# Patient Record
Sex: MALE | Race: WHITE | NOT HISPANIC OR LATINO | Employment: FULL TIME | URBAN - METROPOLITAN AREA
[De-identification: names, ages, dates, MRNs, and addresses within clinical notes are randomized per-mention and may not be internally consistent; named-entity substitution may affect disease eponyms.]

---

## 2017-02-14 ENCOUNTER — OFFICE VISIT (OUTPATIENT)
Dept: SPORTS MEDICINE | Facility: CLINIC | Age: 57
End: 2017-02-14

## 2017-02-14 VITALS
BODY MASS INDEX: 25.48 KG/M2 | HEIGHT: 70 IN | TEMPERATURE: 98.4 F | HEART RATE: 69 BPM | OXYGEN SATURATION: 99 % | WEIGHT: 178 LBS | DIASTOLIC BLOOD PRESSURE: 62 MMHG | SYSTOLIC BLOOD PRESSURE: 100 MMHG

## 2017-02-14 DIAGNOSIS — Z00.00 PREVENTATIVE HEALTH CARE: Primary | ICD-10-CM

## 2017-02-14 PROCEDURE — 99396 PREV VISIT EST AGE 40-64: CPT | Performed by: FAMILY MEDICINE

## 2017-02-14 NOTE — PROGRESS NOTES
"Subjective   Bebeto Burgess is a 56 y.o. male.   Chief Complaint   Patient presents with   • Annual Exam     CPE       History of Present Illness   1.  CPE  2.  No c/o.   3.  Sees derm q year  4.  Cscope UTD      Review of Systems   Constitutional: Negative for activity change, appetite change, chills, diaphoresis, fatigue, fever and unexpected weight change.   HENT: Negative for congestion, ear pain, postnasal drip, rhinorrhea, sinus pressure, sneezing, sore throat and trouble swallowing.    Eyes: Negative for visual disturbance.   Respiratory: Negative for cough, chest tightness, shortness of breath and wheezing.    Cardiovascular: Negative for chest pain and palpitations.   Gastrointestinal: Negative for abdominal pain, blood in stool, nausea and vomiting.   Endocrine: Negative for cold intolerance, polydipsia, polyphagia and polyuria.   Genitourinary: Negative for dysuria, flank pain, frequency, hematuria and urgency.   Musculoskeletal: Negative for arthralgias, back pain, joint swelling and myalgias.   Skin: Negative for rash.   Allergic/Immunologic: Negative for environmental allergies.   Neurological: Negative for dizziness, syncope, weakness, numbness and headaches.   Hematological: Negative for adenopathy. Does not bruise/bleed easily.   Psychiatric/Behavioral: Negative for agitation, decreased concentration, dysphoric mood, sleep disturbance and suicidal ideas. The patient is not nervous/anxious.      Visit Vitals   • /62 (BP Location: Left arm, Patient Position: Sitting, Cuff Size: Adult)   • Pulse 69   • Temp 98.4 °F (36.9 °C) (Oral)   • Ht 70\" (177.8 cm)   • Wt 178 lb (80.7 kg)   • SpO2 99%   • BMI 25.54 kg/m2         Objective   Physical Exam   Constitutional: He is oriented to person, place, and time. He appears well-developed and well-nourished.   HENT:   Head: Normocephalic and atraumatic.   Right Ear: External ear normal.   Left Ear: External ear normal.   Nose: Nose normal. "   Mouth/Throat: Oropharynx is clear and moist.   Eyes: Conjunctivae and EOM are normal. Pupils are equal, round, and reactive to light.   Neck: Normal range of motion. Neck supple. No thyromegaly present.   Cardiovascular: Normal rate, regular rhythm and normal heart sounds.    No peripheral edema   Pulmonary/Chest: Effort normal and breath sounds normal.   Neurological: He is alert and oriented to person, place, and time.   Skin: Skin is warm, dry and intact.   Psychiatric: He has a normal mood and affect. His behavior is normal. Thought content normal.   Vitals reviewed.  Reviewed labs from 12/16/2016, all good    Assessment/Plan   Bebeto was seen today for annual exam.    Diagnoses and all orders for this visit:    Preventative health care  -     Cancel: CBC & Differential  -     Cancel: Comprehensive Metabolic Panel  -     Cancel: Lipid Panel With / Chol / HDL Ratio  -     Cancel: PSA  -     Cancel: T4, Free  -     Cancel: TSH  -     Cancel: UA / M With / Rflx Culture([facility] ONLY)    Other orders  -     Cancel: Hepatitis C Antibody

## 2018-04-19 ENCOUNTER — OFFICE VISIT (OUTPATIENT)
Dept: SPORTS MEDICINE | Facility: CLINIC | Age: 58
End: 2018-04-19

## 2018-04-19 VITALS
DIASTOLIC BLOOD PRESSURE: 76 MMHG | BODY MASS INDEX: 25.94 KG/M2 | WEIGHT: 181.2 LBS | SYSTOLIC BLOOD PRESSURE: 112 MMHG | HEART RATE: 68 BPM | HEIGHT: 70 IN | OXYGEN SATURATION: 98 % | TEMPERATURE: 98.6 F

## 2018-04-19 DIAGNOSIS — Z00.00 PREVENTATIVE HEALTH CARE: Primary | ICD-10-CM

## 2018-04-19 DIAGNOSIS — Z23 IMMUNIZATION DUE: ICD-10-CM

## 2018-04-19 PROCEDURE — 90471 IMMUNIZATION ADMIN: CPT | Performed by: FAMILY MEDICINE

## 2018-04-19 PROCEDURE — 99396 PREV VISIT EST AGE 40-64: CPT | Performed by: FAMILY MEDICINE

## 2018-04-19 PROCEDURE — 90715 TDAP VACCINE 7 YRS/> IM: CPT | Performed by: FAMILY MEDICINE

## 2018-04-19 NOTE — PROGRESS NOTES
Bebeto NICOLE QuinonesJenifer is here today for an annual physical exam.     Eating a healthy diet. Exercising routinely.     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     I have reviewed the patient's medical, family, and social history in detail and updated the computerized patient record.    Screening history:  Colonoscopy - Will need to find old cscope  Prostate - last year  Metabolic - last year    Health Maintenance   Topic Date Due   • TDAP/TD VACCINES (1 - Tdap) 04/07/1981   • COLONOSCOPY  04/19/2018   • INFLUENZA VACCINE  08/01/2018   • HEPATITIS C SCREENING  Completed       Review of Systems   Constitutional: Negative for activity change, appetite change, chills, diaphoresis, fatigue, fever and unexpected weight change.   HENT: Negative for congestion, ear pain, postnasal drip, rhinorrhea, sinus pressure, sneezing, sore throat and trouble swallowing.    Eyes: Negative for visual disturbance.   Respiratory: Negative for cough, chest tightness, shortness of breath and wheezing.    Cardiovascular: Negative for chest pain and palpitations.   Gastrointestinal: Negative for abdominal pain, blood in stool, nausea and vomiting.   Endocrine: Negative for cold intolerance, polydipsia, polyphagia and polyuria.   Genitourinary: Negative for dysuria, flank pain, frequency, hematuria and urgency.   Musculoskeletal: Negative for arthralgias, back pain, joint swelling and myalgias.   Skin: Negative for rash.   Allergic/Immunologic: Negative for environmental allergies.   Neurological: Negative for dizziness, syncope, weakness, numbness and headaches.   Hematological: Negative for adenopathy. Does not bruise/bleed easily.   Psychiatric/Behavioral: Negative for agitation, decreased concentration, dysphoric mood, sleep disturbance and suicidal ideas. The patient is not nervous/anxious.        /76 (BP  "Location: Right arm, Patient Position: Sitting, Cuff Size: Adult)   Pulse 68   Temp 98.6 °F (37 °C) (Oral)   Ht 177.8 cm (70\")   Wt 82.2 kg (181 lb 3.2 oz)   SpO2 98%   BMI 26.00 kg/m²      Physical Exam    Vital signs reviewed.  General appearance: No acute distress  Eyes: conjunctiva clear without erythema; pupils equally round and reactive  ENT: external ears and nose normal; hearing normal, oropharynx clear  Neck: supple; no thyromegaly  CV: normal rate and rhythm; no peripheral edema  Respiratory: normal respiratory effort; lungs clear to auscultation bilaterally  MSK: normal gait and station; no focal joint deformity or swelling  Skin: no rash or wounds; normal turgor  Neuro: cranial nerves 2-12 grossly intact; normal sensation to light touch  Psych: mood and affect normal; recent and remote memory intact    No visits with results within 2 Week(s) from this visit.   Latest known visit with results is:   Lab on 12/16/2016   Component Date Value Ref Range Status   • WBC 12/17/2016 4.59  4.50 - 10.70 10*3/mm3 Final   • RBC 12/17/2016 4.79  4.60 - 6.00 10*6/mm3 Final   • Hemoglobin 12/17/2016 14.7  13.7 - 17.6 g/dL Final   • Hematocrit 12/17/2016 44.8  40.4 - 52.2 % Final   • MCV 12/17/2016 93.5  79.8 - 96.2 fL Final   • MCH 12/17/2016 30.7  27.0 - 32.7 pg Final   • MCHC 12/17/2016 32.8  32.6 - 36.4 g/dL Final   • RDW 12/17/2016 12.9  11.5 - 14.5 % Final   • Platelets 12/17/2016 174  140 - 500 10*3/mm3 Final   • Neutrophil Rel % 12/17/2016 66.9  42.7 - 76.0 % Final   • Lymphocyte Rel % 12/17/2016 22.0  19.6 - 45.3 % Final   • Monocyte Rel % 12/17/2016 7.4  5.0 - 12.0 % Final   • Eosinophil Rel % 12/17/2016 3.3  0.3 - 6.2 % Final   • Basophil Rel % 12/17/2016 0.4  0.0 - 1.5 % Final   • Neutrophils Absolute 12/17/2016 3.07  1.90 - 8.10 10*3/mm3 Final   • Lymphocytes Absolute 12/17/2016 1.01  0.90 - 4.80 10*3/mm3 Final   • Monocytes Absolute 12/17/2016 0.34  0.20 - 1.20 10*3/mm3 Final   • Eosinophils Absolute " 12/17/2016 0.15  0.00 - 0.70 10*3/mm3 Final   • Basophils Absolute 12/17/2016 0.02  0.00 - 0.20 10*3/mm3 Final   • Immature Granulocyte Rel % 12/17/2016 0.0  0.0 - 0.5 % Final   • Immature Grans Absolute 12/17/2016 0.00  0.00 - 0.03 10*3/mm3 Final   • Glucose 12/17/2016 108* 65 - 99 mg/dL Final   • BUN 12/17/2016 21* 6 - 20 mg/dL Final   • Creatinine 12/17/2016 1.23  0.76 - 1.27 mg/dL Final   • eGFR Non  Am 12/17/2016 61  >60 mL/min/1.73 Final   • eGFR African Am 12/17/2016 74  >60 mL/min/1.73 Final   • BUN/Creatinine Ratio 12/17/2016 17.1  7.0 - 25.0 Final   • Sodium 12/17/2016 146* 136 - 145 mmol/L Final   • Potassium 12/17/2016 4.5  3.5 - 5.2 mmol/L Final   • Chloride 12/17/2016 106  98 - 107 mmol/L Final   • Total CO2 12/17/2016 25.8  22.0 - 29.0 mmol/L Final   • Calcium 12/17/2016 9.2  8.6 - 10.5 mg/dL Final   • Total Protein 12/17/2016 7.0  6.0 - 8.5 g/dL Final   • Albumin 12/17/2016 4.70  3.50 - 5.20 g/dL Final   • Globulin 12/17/2016 2.3  gm/dL Final   • A/G Ratio 12/17/2016 2.0  g/dL Final   • Total Bilirubin 12/17/2016 0.5  0.1 - 1.2 mg/dL Final   • Alkaline Phosphatase 12/17/2016 71  39 - 117 U/L Final   • AST (SGOT) 12/17/2016 13  1 - 40 U/L Final   • ALT (SGPT) 12/17/2016 16  1 - 41 U/L Final   • Hep C Virus Ab 12/17/2016 <0.1  0.0 - 0.9 s/co ratio Final    Comment:                                   Negative:     < 0.8                               Indeterminate: 0.8 - 0.9                                    Positive:     > 0.9   The CDC recommends that a positive HCV antibody result   be followed up with a HCV Nucleic Acid Amplification   test (919758).     • Total Cholesterol 12/17/2016 149  0 - 200 mg/dL Final   • Triglycerides 12/17/2016 94  0 - 150 mg/dL Final   • HDL Cholesterol 12/17/2016 59  40 - 60 mg/dL Final   • VLDL Cholesterol 12/17/2016 18.8  5 - 40 mg/dL Final   • LDL Cholesterol  12/17/2016 71  0 - 100 mg/dL Final   • Chol/HDL Ratio 12/17/2016 2.53   Final   • PSA 12/17/2016 1.520   0.000 - 4.000 ng/mL Final   • TSH 12/17/2016 1.760  0.270 - 4.200 mIU/mL Final   • Free T4 12/17/2016 1.09  0.93 - 1.70 ng/dL Final   • Specific Gravity, UA 12/17/2016 1.024  1.005 - 1.030 Final   • pH, UA 12/17/2016 7.0  5.0 - 7.5 Final   • Color, UA 12/17/2016 Yellow  Yellow Final   • Appearance, UA 12/17/2016 Clear  Clear Final   • Leukocytes, UA 12/17/2016 Negative  Negative Final   • Protein 12/17/2016 Trace  Negative/Trace Final   • Glucose, UA 12/17/2016 Negative  Negative Final   • Ketones 12/17/2016 Negative  Negative Final   • Blood, UA 12/17/2016 Negative  Negative Final   • Bilirubin, UA 12/17/2016 Negative  Negative Final   • Urobilinogen, UA 12/17/2016 1.0  0.2 - 1.0 mg/dL Final   • Nitrite, UA 12/17/2016 Negative  Negative Final   • Microscopic Examination 12/17/2016 Comment   Final   • MICROSCOPIC EXAMINATION 12/17/2016 See below:   Final   • Urinalysis Reflex 12/17/2016 Comment   Final   • WBC, UA 12/17/2016 0-5  0 - 5 /hpf Final   • RBC, UA 12/17/2016 0-2  0 - 2 /hpf Final   • Epithelial Cells (non renal) 12/17/2016 0-10  0 - 10 /hpf Final   • Crystals, UA 12/17/2016 Present* N/A /lpf Final   • Crystal Type 12/17/2016 Amorphous Sediment  N/A Final   • Mucus, UA 12/17/2016 Present  Not Estab. /hpf Final   • Bacteria, UA 12/17/2016 Few  None seen/Few /hpf Final        Bebeto was seen today for annual exam.    Diagnoses and all orders for this visit:    Preventative health care  -     CBC & Differential  -     Comprehensive Metabolic Panel  -     Lipid Panel With / Chol / HDL Ratio  -     T4, Free  -     TSH  -     UA / M With / Rflx Culture([facility] ONLY) - Urine, Clean Catch  -     PSA Screen    Immunization due  -     Tdap Vaccine Greater Than or Equal To 6yo IM        Encourage healthy diet and exercise.  Encourage patient to stay up to date on screening examinations as indicated based on age and risk factors.    EMR Dragon/Transcription disclaimer:    Much of this encounter note is an electronic  transcription/translation of spoken language to printed text.  The electronic translation of spoken language may permit erroneous, or at times, nonsensical words or phrases to be inadvertently transcribed.  Although I have reviewed the note for such errors some may still exist.

## 2018-04-20 LAB
ALBUMIN SERPL-MCNC: 4.7 G/DL (ref 3.5–5.2)
ALBUMIN/GLOB SERPL: 1.9 G/DL
ALP SERPL-CCNC: 72 U/L (ref 39–117)
ALT SERPL-CCNC: 17 U/L (ref 1–41)
APPEARANCE UR: CLEAR
AST SERPL-CCNC: 18 U/L (ref 1–40)
BACTERIA #/AREA URNS HPF: NORMAL /HPF
BASOPHILS # BLD AUTO: 0.01 10*3/MM3 (ref 0–0.2)
BASOPHILS NFR BLD AUTO: 0.2 % (ref 0–1.5)
BILIRUB SERPL-MCNC: 0.6 MG/DL (ref 0.1–1.2)
BILIRUB UR QL STRIP: NEGATIVE
BUN SERPL-MCNC: 26 MG/DL (ref 6–20)
BUN/CREAT SERPL: 25.5 (ref 7–25)
CALCIUM SERPL-MCNC: 9 MG/DL (ref 8.6–10.5)
CHLORIDE SERPL-SCNC: 105 MMOL/L (ref 98–107)
CHOLEST SERPL-MCNC: 161 MG/DL (ref 0–200)
CHOLEST/HDLC SERPL: 2.93 {RATIO}
CO2 SERPL-SCNC: 25.4 MMOL/L (ref 22–29)
COLOR UR: YELLOW
CREAT SERPL-MCNC: 1.02 MG/DL (ref 0.76–1.27)
EOSINOPHIL # BLD AUTO: 0.08 10*3/MM3 (ref 0–0.7)
EOSINOPHIL NFR BLD AUTO: 1.3 % (ref 0.3–6.2)
EPI CELLS #/AREA URNS HPF: NORMAL /HPF
ERYTHROCYTE [DISTWIDTH] IN BLOOD BY AUTOMATED COUNT: 13.1 % (ref 11.5–14.5)
GFR SERPLBLD CREATININE-BSD FMLA CKD-EPI: 76 ML/MIN/1.73
GFR SERPLBLD CREATININE-BSD FMLA CKD-EPI: 92 ML/MIN/1.73
GLOBULIN SER CALC-MCNC: 2.5 GM/DL
GLUCOSE SERPL-MCNC: 95 MG/DL (ref 65–99)
GLUCOSE UR QL: NEGATIVE
HCT VFR BLD AUTO: 48.1 % (ref 40.4–52.2)
HDLC SERPL-MCNC: 55 MG/DL (ref 40–60)
HGB BLD-MCNC: 15.5 G/DL (ref 13.7–17.6)
HGB UR QL STRIP: NEGATIVE
IMM GRANULOCYTES # BLD: 0.02 10*3/MM3 (ref 0–0.03)
IMM GRANULOCYTES NFR BLD: 0.3 % (ref 0–0.5)
KETONES UR QL STRIP: NEGATIVE
LDLC SERPL CALC-MCNC: 91 MG/DL (ref 0–100)
LEUKOCYTE ESTERASE UR QL STRIP: NEGATIVE
LYMPHOCYTES # BLD AUTO: 1.23 10*3/MM3 (ref 0.9–4.8)
LYMPHOCYTES NFR BLD AUTO: 19.5 % (ref 19.6–45.3)
MCH RBC QN AUTO: 30.5 PG (ref 27–32.7)
MCHC RBC AUTO-ENTMCNC: 32.2 G/DL (ref 32.6–36.4)
MCV RBC AUTO: 94.5 FL (ref 79.8–96.2)
MICRO URNS: NORMAL
MICRO URNS: NORMAL
MONOCYTES # BLD AUTO: 0.55 10*3/MM3 (ref 0.2–1.2)
MONOCYTES NFR BLD AUTO: 8.7 % (ref 5–12)
MUCOUS THREADS URNS QL MICRO: PRESENT /HPF
NEUTROPHILS # BLD AUTO: 4.42 10*3/MM3 (ref 1.9–8.1)
NEUTROPHILS NFR BLD AUTO: 70 % (ref 42.7–76)
NITRITE UR QL STRIP: NEGATIVE
PH UR STRIP: 7 [PH] (ref 5–7.5)
PLATELET # BLD AUTO: 155 10*3/MM3 (ref 140–500)
POTASSIUM SERPL-SCNC: 4.1 MMOL/L (ref 3.5–5.2)
PROT SERPL-MCNC: 7.2 G/DL (ref 6–8.5)
PROT UR QL STRIP: NEGATIVE
PSA SERPL-MCNC: 1.24 NG/ML (ref 0–4)
RBC # BLD AUTO: 5.09 10*6/MM3 (ref 4.6–6)
RBC #/AREA URNS HPF: NORMAL /HPF
SODIUM SERPL-SCNC: 144 MMOL/L (ref 136–145)
SP GR UR: 1.03 (ref 1–1.03)
T4 FREE SERPL-MCNC: 1.07 NG/DL (ref 0.93–1.7)
TRIGL SERPL-MCNC: 77 MG/DL (ref 0–150)
TSH SERPL DL<=0.005 MIU/L-ACNC: 1.73 MIU/ML (ref 0.27–4.2)
URINALYSIS REFLEX: NORMAL
UROBILINOGEN UR STRIP-MCNC: 0.2 MG/DL (ref 0.2–1)
VLDLC SERPL CALC-MCNC: 15.4 MG/DL (ref 5–40)
WBC # BLD AUTO: 6.31 10*3/MM3 (ref 4.5–10.7)
WBC #/AREA URNS HPF: NORMAL /HPF

## 2019-05-22 ENCOUNTER — OFFICE VISIT (OUTPATIENT)
Dept: SPORTS MEDICINE | Facility: CLINIC | Age: 59
End: 2019-05-22

## 2019-05-22 VITALS
TEMPERATURE: 98.7 F | WEIGHT: 181 LBS | BODY MASS INDEX: 25.91 KG/M2 | HEART RATE: 76 BPM | DIASTOLIC BLOOD PRESSURE: 76 MMHG | OXYGEN SATURATION: 99 % | HEIGHT: 70 IN | SYSTOLIC BLOOD PRESSURE: 124 MMHG

## 2019-05-22 DIAGNOSIS — J01.00 ACUTE NON-RECURRENT MAXILLARY SINUSITIS: ICD-10-CM

## 2019-05-22 DIAGNOSIS — R05.3 CHRONIC COUGH: Primary | ICD-10-CM

## 2019-05-22 PROCEDURE — 99214 OFFICE O/P EST MOD 30 MIN: CPT | Performed by: FAMILY MEDICINE

## 2019-05-22 RX ORDER — FEXOFENADINE HYDROCHLORIDE 60 MG/1
60 TABLET, FILM COATED ORAL DAILY
COMMUNITY
End: 2022-10-19

## 2019-05-22 RX ORDER — BENZONATATE 100 MG/1
100 CAPSULE ORAL 3 TIMES DAILY PRN
COMMUNITY
End: 2019-07-29

## 2019-05-22 RX ORDER — AMOXICILLIN AND CLAVULANATE POTASSIUM 875; 125 MG/1; MG/1
1 TABLET, FILM COATED ORAL 2 TIMES DAILY
Qty: 20 TABLET | Refills: 0 | Status: SHIPPED | OUTPATIENT
Start: 2019-05-22 | End: 2019-06-01

## 2019-05-22 NOTE — PROGRESS NOTES
"Bebeto is a 59 y.o. year old male    Chief Complaint   Patient presents with   • Cough     had since March, dry cough little mucus        History of Present Illness  Here for further evaluation regarding cough.  He had URI symptoms, flulike symptoms early March when the cough began.  He associated fever that time though none currently.  Since, has had persistent dry cough but occasionally productive.  He called into [employer] through his employer and was prescribed Tessalon Perles which have had no effect.  Has recently started taking Allegra.    I have reviewed the patient's medical, family, and social history in detail and updated the computerized patient record.    Review of Systems  Constitutional: Per HPI.  HEENT: Per HPI  CV: no palpitations  Resp: Per HPI  Musculoskeletal: Negative for myalgias or arthralgias.  Skin: Negative for rash.   Neurological: Negative for weakness and numbness.   Psychiatric/Behavioral: Negative for sleep disturbance.   All other systems reviewed and are negative.    /76   Pulse 76   Temp 98.7 °F (37.1 °C)   Ht 177.8 cm (70\")   Wt 82.1 kg (181 lb)   SpO2 99%   BMI 25.97 kg/m²      Physical Exam    Vital signs reviewed.   General: No acute distress.  Eyes: conjunctiva clear; pupils equally round and reactive  ENT: external ears and nose atraumatic; oropharynx clear  CV: RRR; no peripheral edema, 2+ distal pulses  Resp: CTA b/l; normal respiratory effort, no use of accessory muscles  Skin: no rashes or wounds; normal turgor  Psych: mood and affect appropriate; recent and remote memory intact  Neuro: sensation to light touch intact    Chest X-Ray  Indication: Cough  Views: PA and Lateral    Findings:  Normal lung markings.  No pleural effusion.  Heart size and shape are normal.  Mediastinum is normal.  No visible rib fractures.   Overall, normal chest.    There were no previous studies available for comparison.      Diagnoses and all orders for this visit:    Chronic " cough  -     XR Chest 2 View    Acute non-recurrent maxillary sinusitis  -     amoxicillin-clavulanate (AUGMENTIN) 875-125 MG per tablet; Take 1 tablet by mouth 2 (Two) Times a Day for 10 days.    Other orders  -     fexofenadine (ALLEGRA) 60 MG tablet; Take 60 mg by mouth Daily.  -     benzonatate (TESSALON) 100 MG capsule; Take 100 mg by mouth 3 (Three) Times a Day As Needed for Cough.      Reassurance given with normal chest x-ray.  Symptoms likely related to postnasal drip, unresolved sinus infection and I will send in Augmentin.  Follow-up if symptoms persist.    EMR Dragon/Transcription disclaimer:    Much of this encounter note is an electronic transcription/translation of spoken language to printed text.  The electronic translation of spoken language may permit erroneous, or at times, nonsensical words or phrases to be inadvertently transcribed.  Although I have reviewed the note for such errors some may still exist.

## 2019-06-06 ENCOUNTER — TELEPHONE (OUTPATIENT)
Dept: SPORTS MEDICINE | Facility: CLINIC | Age: 59
End: 2019-06-06

## 2019-06-06 NOTE — TELEPHONE ENCOUNTER
Pt states that the cough has not gotten any better and that he does not know what to do at this point. Please advise, thank you!

## 2019-06-07 DIAGNOSIS — R05.3 CHRONIC COUGH: Primary | ICD-10-CM

## 2019-06-07 RX ORDER — METHYLPREDNISOLONE 4 MG/1
TABLET ORAL
Qty: 21 TABLET | Refills: 0 | Status: SHIPPED | OUTPATIENT
Start: 2019-06-07 | End: 2019-07-29

## 2019-07-29 ENCOUNTER — OFFICE VISIT (OUTPATIENT)
Dept: SPORTS MEDICINE | Facility: CLINIC | Age: 59
End: 2019-07-29

## 2019-07-29 VITALS
TEMPERATURE: 97.8 F | WEIGHT: 184 LBS | DIASTOLIC BLOOD PRESSURE: 70 MMHG | HEIGHT: 70 IN | SYSTOLIC BLOOD PRESSURE: 136 MMHG | BODY MASS INDEX: 26.34 KG/M2 | HEART RATE: 68 BPM | OXYGEN SATURATION: 97 %

## 2019-07-29 DIAGNOSIS — J98.01 POST-INFECTION BRONCHOSPASM: Primary | ICD-10-CM

## 2019-07-29 PROCEDURE — 99213 OFFICE O/P EST LOW 20 MIN: CPT | Performed by: FAMILY MEDICINE

## 2019-07-29 NOTE — PROGRESS NOTES
"Bebeto is a 59 y.o. year old male evaluation of a problem that is new to this examiner.    Chief Complaint   Patient presents with   • Cough     states that it has not gone away since march        History of Present Illness   HPI   In March of this year patient had significant respiratory illness causing a productive cough.  Patient eventually got over that but was left with a dry cough that would come and go.  Patient was seen here on 5/22/2018, reviewed his chest x-ray report and there was no active disease.  Over the past week or 2 patient has noted he is still having a cough.  Cough can be brought on by prolonged talking.  Or laughing.  No fever chills.  No shortness of breath.  Patient also has noted over the past couple of days more postnasal drainage.    I have reviewed the patient's medical, family, and social history in detail and updated the computerized patient record.    Review of Systems   Constitutional: Negative.    HENT: Positive for postnasal drip.    Eyes: Negative.    Respiratory: Positive for cough. Negative for shortness of breath, wheezing and stridor.    Cardiovascular: Negative.    Gastrointestinal: Negative.    Genitourinary: Negative.        /70 (BP Location: Left arm, Patient Position: Sitting, Cuff Size: Adult)   Pulse 68   Temp 97.8 °F (36.6 °C) (Oral)   Ht 177.8 cm (70\")   Wt 83.5 kg (184 lb)   SpO2 97%   BMI 26.40 kg/m²      Physical Exam   Constitutional: He is oriented to person, place, and time. He appears well-developed and well-nourished.   HENT:   Head: Normocephalic and atraumatic.   Very scant amount postnasal drainage.   Eyes: Conjunctivae and EOM are normal. Pupils are equal, round, and reactive to light.   Cardiovascular: Normal rate, regular rhythm and normal heart sounds.   No peripheral edema   Pulmonary/Chest: Effort normal and breath sounds normal. No respiratory distress. He has no wheezes.   Neurological: He is alert and oriented to person, place, and " time.   Skin: Skin is warm and dry.   Psychiatric: He has a normal mood and affect. His behavior is normal.   Vitals reviewed.        Current Outpatient Medications:   •  fexofenadine (ALLEGRA) 60 MG tablet, Take 60 mg by mouth Daily., Disp: , Rfl:   •  fluticasone-salmeterol (ADVAIR DISKUS) 250-50 MCG/DOSE DISKUS, Inhale 1 puff 2 (Two) Times a Day. Rinse mouth after each use, Disp: 60 each, Rfl: 0     Diagnoses and all orders for this visit:    Post-infection bronchospasm  -     fluticasone-salmeterol (ADVAIR DISKUS) 250-50 MCG/DOSE DISKUS; Inhale 1 puff 2 (Two) Times a Day. Rinse mouth after each use       If patient sees no significant change over the next 10 to 14 days then he will let me know.  Plan to treat for 1 month.  Also suggested Flonase OTC for postnasal drip.      EMR Dragon/Transcription disclaimer:    Much of this encounter note is an electronic transcription/translation of spoken language to printed text.  The electronic translation of spoken language may permit erroneous, or at times, nonsensical words or phrases to be inadvertently transcribed.  Although I have reviewed the note for such errors some may still exist.

## 2021-10-05 ENCOUNTER — OFFICE VISIT (OUTPATIENT)
Dept: SPORTS MEDICINE | Facility: CLINIC | Age: 61
End: 2021-10-05

## 2021-10-05 VITALS
OXYGEN SATURATION: 99 % | DIASTOLIC BLOOD PRESSURE: 72 MMHG | BODY MASS INDEX: 24.77 KG/M2 | HEIGHT: 70 IN | WEIGHT: 173 LBS | HEART RATE: 70 BPM | SYSTOLIC BLOOD PRESSURE: 130 MMHG | TEMPERATURE: 97.1 F

## 2021-10-05 DIAGNOSIS — Z00.00 PREVENTATIVE HEALTH CARE: Primary | ICD-10-CM

## 2021-10-05 DIAGNOSIS — Z12.5 SCREENING FOR PROSTATE CANCER: ICD-10-CM

## 2021-10-05 PROCEDURE — 99396 PREV VISIT EST AGE 40-64: CPT | Performed by: FAMILY MEDICINE

## 2021-10-05 NOTE — PROGRESS NOTES
"Bebeto Burgess is here today for an annual physical exam.     [place].   .     PHQ-2 Depression Screening  Little interest or pleasure in doing things?  0   Feeling down, depressed, or hopeless?  0   PHQ-2 Total Score  0        I have reviewed the patient's medical, family, and social history in detail and updated the computerized patient record.    Screening history:  Colonoscopy - utd  Prostate - due  Metabolic - last year    Health Maintenance   Topic Date Due   • ANNUAL PHYSICAL  04/20/2019   • INFLUENZA VACCINE  Never done   • COLORECTAL CANCER SCREENING  12/12/2023   • TDAP/TD VACCINES (2 - Td or Tdap) 04/19/2028   • HEPATITIS C SCREENING  Completed   • COVID-19 Vaccine  Completed   • ZOSTER VACCINE  Completed   • Pneumococcal Vaccine 0-64  Aged Out       Review of Systems    /72 (BP Location: Left arm, Patient Position: Sitting, Cuff Size: Adult)   Pulse 70   Temp 97.1 °F (36.2 °C) (Temporal)   Ht 177.8 cm (70\")   Wt 78.5 kg (173 lb)   SpO2 99%   BMI 24.82 kg/m²      Physical Exam    Vital signs reviewed.  General appearance: No acute distress  Eyes: conjunctiva clear without erythema; pupils equally round and reactive  ENT: external ears normal; hearing normal  Neck: supple; no thyromegaly  CV: normal rate and rhythm; no peripheral edema  Respiratory: normal respiratory effort; lungs clear to auscultation bilaterally  MSK: normal gait and station; no focal joint deformity or swelling  Skin: no rash or wounds; normal turgor  Neuro: cranial nerves 2-12 grossly intact; normal sensation to light touch  Psych: mood and affect normal; recent and remote memory intact    No visits with results within 2 Week(s) from this visit.   Latest known visit with results is:   Office Visit on 04/19/2018   Component Date Value Ref Range Status   • WBC 04/19/2018 6.31  4.50 - 10.70 10*3/mm3 Final   • RBC 04/19/2018 5.09  4.60 - 6.00 10*6/mm3 Final   • Hemoglobin 04/19/2018 15.5  13.7 - 17.6 " g/dL Final   • Hematocrit 04/19/2018 48.1  40.4 - 52.2 % Final   • MCV 04/19/2018 94.5  79.8 - 96.2 fL Final   • MCH 04/19/2018 30.5  27.0 - 32.7 pg Final   • MCHC 04/19/2018 32.2* 32.6 - 36.4 g/dL Final   • RDW 04/19/2018 13.1  11.5 - 14.5 % Final   • Platelets 04/19/2018 155  140 - 500 10*3/mm3 Final   • Neutrophil Rel % 04/19/2018 70.0  42.7 - 76.0 % Final   • Lymphocyte Rel % 04/19/2018 19.5* 19.6 - 45.3 % Final   • Monocyte Rel % 04/19/2018 8.7  5.0 - 12.0 % Final   • Eosinophil Rel % 04/19/2018 1.3  0.3 - 6.2 % Final   • Basophil Rel % 04/19/2018 0.2  0.0 - 1.5 % Final   • Neutrophils Absolute 04/19/2018 4.42  1.90 - 8.10 10*3/mm3 Final   • Lymphocytes Absolute 04/19/2018 1.23  0.90 - 4.80 10*3/mm3 Final   • Monocytes Absolute 04/19/2018 0.55  0.20 - 1.20 10*3/mm3 Final   • Eosinophils Absolute 04/19/2018 0.08  0.00 - 0.70 10*3/mm3 Final   • Basophils Absolute 04/19/2018 0.01  0.00 - 0.20 10*3/mm3 Final   • Immature Granulocyte Rel % 04/19/2018 0.3  0.0 - 0.5 % Final   • Immature Grans Absolute 04/19/2018 0.02  0.00 - 0.03 10*3/mm3 Final   • Glucose 04/19/2018 95  65 - 99 mg/dL Final   • BUN 04/19/2018 26* 6 - 20 mg/dL Final   • Creatinine 04/19/2018 1.02  0.76 - 1.27 mg/dL Final   • eGFR Non  Am 04/19/2018 76  >60 mL/min/1.73 Final   • eGFR African Am 04/19/2018 92  >60 mL/min/1.73 Final   • BUN/Creatinine Ratio 04/19/2018 25.5* 7.0 - 25.0 Final   • Sodium 04/19/2018 144  136 - 145 mmol/L Final   • Potassium 04/19/2018 4.1  3.5 - 5.2 mmol/L Final   • Chloride 04/19/2018 105  98 - 107 mmol/L Final   • Total CO2 04/19/2018 25.4  22.0 - 29.0 mmol/L Final   • Calcium 04/19/2018 9.0  8.6 - 10.5 mg/dL Final   • Total Protein 04/19/2018 7.2  6.0 - 8.5 g/dL Final   • Albumin 04/19/2018 4.70  3.50 - 5.20 g/dL Final   • Globulin 04/19/2018 2.5  gm/dL Final   • A/G Ratio 04/19/2018 1.9  g/dL Final   • Total Bilirubin 04/19/2018 0.6  0.1 - 1.2 mg/dL Final   • Alkaline Phosphatase 04/19/2018 72  39 - 117 U/L Final    • AST (SGOT) 04/19/2018 18  1 - 40 U/L Final   • ALT (SGPT) 04/19/2018 17  1 - 41 U/L Final   • Total Cholesterol 04/19/2018 161  0 - 200 mg/dL Final   • Triglycerides 04/19/2018 77  0 - 150 mg/dL Final   • HDL Cholesterol 04/19/2018 55  40 - 60 mg/dL Final   • VLDL Cholesterol 04/19/2018 15.4  5 - 40 mg/dL Final   • LDL Cholesterol  04/19/2018 91  0 - 100 mg/dL Final   • Chol/HDL Ratio 04/19/2018 2.93   Final   • Free T4 04/19/2018 1.07  0.93 - 1.70 ng/dL Final   • TSH 04/19/2018 1.730  0.270 - 4.200 mIU/mL Final   • Specific Gravity, UA 04/19/2018 1.026  1.005 - 1.030 Final   • pH, UA 04/19/2018 7.0  5.0 - 7.5 Final   • Color, UA 04/19/2018 Yellow  Yellow Final   • Appearance, UA 04/19/2018 Clear  Clear Final   • Leukocytes, UA 04/19/2018 Negative  Negative Final   • Protein 04/19/2018 Negative  Negative/Trace Final   • Glucose, UA 04/19/2018 Negative  Negative Final   • Ketones 04/19/2018 Negative  Negative Final   • Blood, UA 04/19/2018 Negative  Negative Final   • Bilirubin, UA 04/19/2018 Negative  Negative Final   • Urobilinogen, UA 04/19/2018 0.2  0.2 - 1.0 mg/dL Final   • Nitrite, UA 04/19/2018 Negative  Negative Final   • Microscopic Examination 04/19/2018 Comment   Final    Microscopic follows if indicated.   • Microscopic Examination 04/19/2018 See below:   Final    Microscopic was indicated and was performed.   • Urinalysis Reflex 04/19/2018 Comment   Final    This specimen will not reflex to a Urine Culture.   • PSA 04/19/2018 1.240  0.000 - 4.000 ng/mL Final   • WBC, UA 04/19/2018 0-5  0 - 5 /hpf Final   • RBC, UA 04/19/2018 None seen  0 - 2 /hpf Final   • Epithelial Cells (non renal) 04/19/2018 0-10  0 - 10 /hpf Final   • Mucus, UA 04/19/2018 Present  Not Estab. /hpf Final   • Bacteria, UA 04/19/2018 None seen  None seen/Few /hpf Final          Current Outpatient Medications:   •  fexofenadine (ALLEGRA) 60 MG tablet, Take 60 mg by mouth Daily., Disp: , Rfl:     Diagnoses and all orders for this  visit:    1. Preventative health care (Primary)  -     CBC & Differential  -     Comprehensive Metabolic Panel  -     Lipid Panel With / Chol / HDL Ratio  -     TSH  -     T4, Free  -     UA / M With / Rflx Culture([facility] ONLY) - Urine, Clean Catch    2. Screening for prostate cancer  -     PSA Screen        Encourage healthy diet and exercise.  Encourage patient to stay up to date on screening examinations as indicated based on age and risk factors.    EMR Dragon/Transcription disclaimer:    Much of this encounter note is an electronic transcription/translation of spoken language to printed text.  The electronic translation of spoken language may permit erroneous, or at times, nonsensical words or phrases to be inadvertently transcribed.  Although I have reviewed the note for such errors some may still exist.

## 2021-10-06 LAB
ALBUMIN SERPL-MCNC: 4.9 G/DL (ref 3.5–5.2)
ALBUMIN/GLOB SERPL: 2.5 G/DL
ALP SERPL-CCNC: 67 U/L (ref 39–117)
ALT SERPL-CCNC: 14 U/L (ref 1–41)
APPEARANCE UR: ABNORMAL
AST SERPL-CCNC: 17 U/L (ref 1–40)
BACTERIA #/AREA URNS HPF: NORMAL /HPF
BASOPHILS # BLD AUTO: 0.02 10*3/MM3 (ref 0–0.2)
BASOPHILS NFR BLD AUTO: 0.5 % (ref 0–1.5)
BILIRUB SERPL-MCNC: 0.7 MG/DL (ref 0–1.2)
BILIRUB UR QL STRIP: NEGATIVE
BUN SERPL-MCNC: 19 MG/DL (ref 6–20)
BUN/CREAT SERPL: 22.4 (ref 7–25)
CALCIUM SERPL-MCNC: 9.3 MG/DL (ref 8.6–10.5)
CASTS URNS QL MICRO: NORMAL /LPF
CHLORIDE SERPL-SCNC: 107 MMOL/L (ref 98–107)
CHOLEST SERPL-MCNC: 154 MG/DL (ref 0–200)
CHOLEST/HDLC SERPL: 2.66 {RATIO}
CO2 SERPL-SCNC: 25.5 MMOL/L (ref 22–29)
COLOR UR: YELLOW
CREAT SERPL-MCNC: 0.85 MG/DL (ref 0.76–1.27)
EOSINOPHIL # BLD AUTO: 0.08 10*3/MM3 (ref 0–0.4)
EOSINOPHIL NFR BLD AUTO: 2 % (ref 0.3–6.2)
EPI CELLS #/AREA URNS HPF: NORMAL /HPF (ref 0–10)
ERYTHROCYTE [DISTWIDTH] IN BLOOD BY AUTOMATED COUNT: 12 % (ref 12.3–15.4)
GLOBULIN SER CALC-MCNC: 2 GM/DL
GLUCOSE SERPL-MCNC: 94 MG/DL (ref 65–99)
GLUCOSE UR QL: NEGATIVE
HCT VFR BLD AUTO: 42.1 % (ref 37.5–51)
HDLC SERPL-MCNC: 58 MG/DL (ref 40–60)
HGB BLD-MCNC: 14.3 G/DL (ref 13–17.7)
HGB UR QL STRIP: NEGATIVE
IMM GRANULOCYTES # BLD AUTO: 0.01 10*3/MM3 (ref 0–0.05)
IMM GRANULOCYTES NFR BLD AUTO: 0.3 % (ref 0–0.5)
KETONES UR QL STRIP: NEGATIVE
LDLC SERPL CALC-MCNC: 84 MG/DL (ref 0–100)
LEUKOCYTE ESTERASE UR QL STRIP: NEGATIVE
LYMPHOCYTES # BLD AUTO: 0.96 10*3/MM3 (ref 0.7–3.1)
LYMPHOCYTES NFR BLD AUTO: 24.2 % (ref 19.6–45.3)
MCH RBC QN AUTO: 30.4 PG (ref 26.6–33)
MCHC RBC AUTO-ENTMCNC: 34 G/DL (ref 31.5–35.7)
MCV RBC AUTO: 89.6 FL (ref 79–97)
MICRO URNS: ABNORMAL
MICRO URNS: ABNORMAL
MONOCYTES # BLD AUTO: 0.33 10*3/MM3 (ref 0.1–0.9)
MONOCYTES NFR BLD AUTO: 8.3 % (ref 5–12)
NEUTROPHILS # BLD AUTO: 2.56 10*3/MM3 (ref 1.7–7)
NEUTROPHILS NFR BLD AUTO: 64.7 % (ref 42.7–76)
NITRITE UR QL STRIP: NEGATIVE
NRBC BLD AUTO-RTO: 0 /100 WBC (ref 0–0.2)
PH UR STRIP: 8.5 [PH] (ref 5–7.5)
PLATELET # BLD AUTO: 145 10*3/MM3 (ref 140–450)
POTASSIUM SERPL-SCNC: 4.4 MMOL/L (ref 3.5–5.2)
PROT SERPL-MCNC: 6.9 G/DL (ref 6–8.5)
PROT UR QL STRIP: NEGATIVE
PSA SERPL-MCNC: 1.56 NG/ML (ref 0–4)
RBC # BLD AUTO: 4.7 10*6/MM3 (ref 4.14–5.8)
RBC #/AREA URNS HPF: NORMAL /HPF (ref 0–2)
SODIUM SERPL-SCNC: 142 MMOL/L (ref 136–145)
SP GR UR: 1.02 (ref 1–1.03)
T4 FREE SERPL-MCNC: 1.03 NG/DL (ref 0.93–1.7)
TRIGL SERPL-MCNC: 61 MG/DL (ref 0–150)
TSH SERPL DL<=0.005 MIU/L-ACNC: 1.79 UIU/ML (ref 0.27–4.2)
URINALYSIS REFLEX: ABNORMAL
UROBILINOGEN UR STRIP-MCNC: 0.2 MG/DL (ref 0.2–1)
VLDLC SERPL CALC-MCNC: 12 MG/DL (ref 5–40)
WBC # BLD AUTO: 3.96 10*3/MM3 (ref 3.4–10.8)
WBC #/AREA URNS HPF: NORMAL /HPF (ref 0–5)

## 2022-10-18 ENCOUNTER — OFFICE VISIT (OUTPATIENT)
Dept: FAMILY MEDICINE CLINIC | Facility: CLINIC | Age: 62
End: 2022-10-18

## 2022-10-18 VITALS
HEIGHT: 64 IN | WEIGHT: 182 LBS | BODY MASS INDEX: 31.07 KG/M2 | SYSTOLIC BLOOD PRESSURE: 135 MMHG | DIASTOLIC BLOOD PRESSURE: 83 MMHG | TEMPERATURE: 97.3 F | HEART RATE: 70 BPM | OXYGEN SATURATION: 98 %

## 2022-10-18 DIAGNOSIS — Z00.00 ROUTINE GENERAL MEDICAL EXAMINATION AT A HEALTH CARE FACILITY: Primary | ICD-10-CM

## 2022-10-18 DIAGNOSIS — Z12.5 SCREENING FOR PROSTATE CANCER: ICD-10-CM

## 2022-10-18 PROCEDURE — 99396 PREV VISIT EST AGE 40-64: CPT | Performed by: NURSE PRACTITIONER

## 2022-10-18 NOTE — PROGRESS NOTES
"Chief Complaint  Establish Care (New pt, Physical )    Subjective        Bebeto Burgess presents to [facility]  History of Present Illness new pt here to [facility] for physical.  He was a previous patient of Dr. Orellana.    Considers himself overall healthy and happy.  He is , adult kids, grandchild on the way.      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  Loves working with kids.  Currently working part time.     Health goal to remain healthy, active, continue HM, live to be 89 yr old healthy, happy man.  Would like to lose 4-5 lbs.  Eats overall healthy diet.  Very active woodworking, working in yard.     Has no health concerns.  Sees derm for skin checks.  Goes to dentist.    Has FH prostate cancer-dad dx in 70s and baby brother dx.  Gets yrly psa.  No sx.  No FH breast, ovarian, colon cancers.     Had covid over summer and fatigue took awhile to get over.  Feels well today.      BP a little elevated today, improved with recheck.  No PMH HTN.     Sometimes gets a mild HA back of head-- noticed over summer.  Not as much now.  No dizziness, chest pain, palpitations.  Good appetite without fatigue.  No abd sx, melena.  Will need CRC screen next yr.  No urinary sx, hematuria,  No new muscle, joint pain other than some right lateral wrist pain only with certain mvmt.       Objective   Vital Signs:  /86   Pulse 70   Temp 97.3 °F (36.3 °C)   Ht 162.6 cm (64\")   Wt 82.6 kg (182 lb)   SpO2 98%   BMI 31.24 kg/m²   Estimated body mass index is 31.24 kg/m² as calculated from the following:    Height as of this encounter: 162.6 cm (64\").    Weight as of this encounter: 82.6 kg (182 lb).    BMI is >= 30 and <35. (Class 1 Obesity). The following options were offered after discussion;: weight loss educational material (shared in after visit summary)      Physical Exam  Vitals and nursing note reviewed.   Constitutional:       General: He is " not in acute distress.     Appearance: He is well-developed. He is not ill-appearing.   HENT:      Head: Normocephalic and atraumatic.      Right Ear: Tympanic membrane, ear canal and external ear normal.      Left Ear: Tympanic membrane, ear canal and external ear normal.      Mouth/Throat:      Mouth: Mucous membranes are moist.      Pharynx: Uvula midline. No posterior oropharyngeal erythema.   Eyes:      General: No scleral icterus.        Right eye: No discharge.         Left eye: No discharge.      Conjunctiva/sclera: Conjunctivae normal.      Pupils: Pupils are equal, round, and reactive to light.   Neck:      Thyroid: No thyromegaly.   Cardiovascular:      Rate and Rhythm: Normal rate and regular rhythm.      Heart sounds: Normal heart sounds. No murmur heard.  Pulmonary:      Effort: Pulmonary effort is normal.      Breath sounds: Normal breath sounds.   Abdominal:      General: Bowel sounds are normal.      Palpations: Abdomen is soft.      Tenderness: There is no abdominal tenderness.   Musculoskeletal:         General: No deformity.      Cervical back: Neck supple.      Comments: Gait smooth and steady   Lymphadenopathy:      Cervical: No cervical adenopathy.   Skin:     General: Skin is warm and dry.   Neurological:      General: No focal deficit present.      Mental Status: He is alert and oriented to person, place, and time.   Psychiatric:         Mood and Affect: Mood normal.         Behavior: Behavior normal.         Thought Content: Thought content normal.      Comments: Very pleasant, conversant        Result Review :                Assessment and Plan   Diagnoses and all orders for this visit:    1. Routine general medical examination at a health care facility (Primary)  -     CBC & Differential  -     Comprehensive Metabolic Panel  -     Lipid Panel With LDL / HDL Ratio  -     Microalbumin / Creatinine Urine Ratio - Urine, Clean Catch  -     PSA DIAGNOSTIC ONLY  -     TSH Rfx On Abnormal To Free  T4    2. Screening for prostate cancer  -     PSA DIAGNOSTIC ONLY    Appropriate health maintenance and prevention topics specific for this patient were discussed today.  Additionally, health goals, and health concerns addressed as appropriate.  Pt was encouraged to stay up to date on recommended screenings and vaccines based on USPSTF guidelines.            Follow Up   Return in about 1 year (around 10/18/2023) for Annual physical.  Patient was given instructions and counseling regarding his condition or for health maintenance advice. Please see specific information pulled into the AVS if appropriate.

## 2022-10-19 LAB
ALBUMIN SERPL-MCNC: 4.8 G/DL (ref 3.5–5.2)
ALBUMIN/CREAT UR: 9 MG/G CREAT (ref 0–29)
ALBUMIN/GLOB SERPL: 2.3 G/DL
ALP SERPL-CCNC: 70 U/L (ref 39–117)
ALT SERPL-CCNC: 23 U/L (ref 1–41)
AST SERPL-CCNC: 22 U/L (ref 1–40)
BASOPHILS # BLD AUTO: 0.02 10*3/MM3 (ref 0–0.2)
BASOPHILS NFR BLD AUTO: 0.5 % (ref 0–1.5)
BILIRUB SERPL-MCNC: 0.6 MG/DL (ref 0–1.2)
BUN SERPL-MCNC: 18 MG/DL (ref 8–23)
BUN/CREAT SERPL: 20.5 (ref 7–25)
CALCIUM SERPL-MCNC: 9.2 MG/DL (ref 8.6–10.5)
CHLORIDE SERPL-SCNC: 107 MMOL/L (ref 98–107)
CHOLEST SERPL-MCNC: 158 MG/DL (ref 0–200)
CO2 SERPL-SCNC: 28.1 MMOL/L (ref 22–29)
CREAT SERPL-MCNC: 0.88 MG/DL (ref 0.76–1.27)
CREAT UR-MCNC: 141.6 MG/DL
EGFRCR SERPLBLD CKD-EPI 2021: 98.4 ML/MIN/1.73
EOSINOPHIL # BLD AUTO: 0.09 10*3/MM3 (ref 0–0.4)
EOSINOPHIL NFR BLD AUTO: 2.1 % (ref 0.3–6.2)
ERYTHROCYTE [DISTWIDTH] IN BLOOD BY AUTOMATED COUNT: 12.4 % (ref 12.3–15.4)
GLOBULIN SER CALC-MCNC: 2.1 GM/DL
GLUCOSE SERPL-MCNC: 99 MG/DL (ref 65–99)
HCT VFR BLD AUTO: 45.4 % (ref 37.5–51)
HDLC SERPL-MCNC: 59 MG/DL (ref 40–60)
HGB BLD-MCNC: 15.2 G/DL (ref 13–17.7)
IMM GRANULOCYTES # BLD AUTO: 0.02 10*3/MM3 (ref 0–0.05)
IMM GRANULOCYTES NFR BLD AUTO: 0.5 % (ref 0–0.5)
LDLC SERPL CALC-MCNC: 86 MG/DL (ref 0–100)
LDLC/HDLC SERPL: 1.46 {RATIO}
LYMPHOCYTES # BLD AUTO: 0.98 10*3/MM3 (ref 0.7–3.1)
LYMPHOCYTES NFR BLD AUTO: 23.2 % (ref 19.6–45.3)
MCH RBC QN AUTO: 30.5 PG (ref 26.6–33)
MCHC RBC AUTO-ENTMCNC: 33.5 G/DL (ref 31.5–35.7)
MCV RBC AUTO: 91.2 FL (ref 79–97)
MICROALBUMIN UR-MCNC: 12.6 UG/ML
MONOCYTES # BLD AUTO: 0.37 10*3/MM3 (ref 0.1–0.9)
MONOCYTES NFR BLD AUTO: 8.7 % (ref 5–12)
NEUTROPHILS # BLD AUTO: 2.75 10*3/MM3 (ref 1.7–7)
NEUTROPHILS NFR BLD AUTO: 65 % (ref 42.7–76)
NRBC BLD AUTO-RTO: 0 /100 WBC (ref 0–0.2)
PLATELET # BLD AUTO: 144 10*3/MM3 (ref 140–450)
POTASSIUM SERPL-SCNC: 4.7 MMOL/L (ref 3.5–5.2)
PROT SERPL-MCNC: 6.9 G/DL (ref 6–8.5)
PSA SERPL-MCNC: 1.5 NG/ML (ref 0–4)
RBC # BLD AUTO: 4.98 10*6/MM3 (ref 4.14–5.8)
SODIUM SERPL-SCNC: 143 MMOL/L (ref 136–145)
TRIGL SERPL-MCNC: 65 MG/DL (ref 0–150)
TSH SERPL DL<=0.005 MIU/L-ACNC: 1.76 UIU/ML (ref 0.27–4.2)
VLDLC SERPL CALC-MCNC: 13 MG/DL (ref 5–40)
WBC # BLD AUTO: 4.23 10*3/MM3 (ref 3.4–10.8)

## 2022-10-20 ENCOUNTER — PATIENT ROUNDING (BHMG ONLY) (OUTPATIENT)
Dept: FAMILY MEDICINE CLINIC | Facility: CLINIC | Age: 62
End: 2022-10-20

## 2022-10-20 NOTE — PROGRESS NOTES
A My-Chart message has been sent to the patient for PATIENT ROUNDING with [facility]

## 2022-10-20 NOTE — PROGRESS NOTES
A My-Chart message has been sent to the patient for PATIENT ROUNDING with [facility]

## 2023-10-13 ENCOUNTER — OFFICE VISIT (OUTPATIENT)
Dept: FAMILY MEDICINE CLINIC | Facility: CLINIC | Age: 63
End: 2023-10-13
Payer: COMMERCIAL

## 2023-10-13 ENCOUNTER — HOSPITAL ENCOUNTER (OUTPATIENT)
Dept: GENERAL RADIOLOGY | Facility: HOSPITAL | Age: 63
Discharge: HOME | End: 2023-10-13
Admitting: FAMILY MEDICINE
Payer: COMMERCIAL

## 2023-10-13 VITALS
HEIGHT: 64 IN | WEIGHT: 187.4 LBS | SYSTOLIC BLOOD PRESSURE: 138 MMHG | OXYGEN SATURATION: 98 % | HEART RATE: 69 BPM | TEMPERATURE: 98.4 F | DIASTOLIC BLOOD PRESSURE: 80 MMHG | BODY MASS INDEX: 31.99 KG/M2

## 2023-10-13 DIAGNOSIS — J40 BRONCHITIS: Primary | ICD-10-CM

## 2023-10-13 PROCEDURE — 99213 OFFICE O/P EST LOW 20 MIN: CPT | Performed by: FAMILY MEDICINE

## 2023-10-13 PROCEDURE — 71046 X-RAY EXAM CHEST 2 VIEWS: CPT

## 2023-10-13 RX ORDER — AMOXICILLIN AND CLAVULANATE POTASSIUM 875; 125 MG/1; MG/1
TABLET, FILM COATED ORAL
COMMUNITY
Start: 2023-10-09

## 2023-10-13 RX ORDER — FLUTICASONE PROPIONATE 110 UG/1
1 AEROSOL, METERED RESPIRATORY (INHALATION)
Qty: 12 G | Refills: 0 | Status: SHIPPED | OUTPATIENT
Start: 2023-10-13

## 2023-10-13 NOTE — PROGRESS NOTES
"Chief Complaint  Cough (Intermittent for 6-7 weeks.  Currently on antibiotic, since Monday.  No throat pain.  Coughed up periodically phlegm but no blood.  Productive mainly but occasionally dry.  Chest pressure/soreness from coughing. ) and Fatigue    Subjective        Bebeto Burgess presents to [facility]  History of Present Illness    Number of weeks of dry annoying cough.  No fever.  Feels rundown.  Did a telehealth visit earlier this week.  Started on Augmentin.  No change.  Shortness of breath.  No wheezing.  No history of asthma.  He is a .  He has had more sinus symptoms also.  Thinks may be allergies.  Never smoker.    Objective   Vital Signs:  /80   Pulse 69   Temp 98.4 øF (36.9 øC) (Temporal)   Ht 162.6 cm (64.02\")   Wt 85 kg (187 lb 6.4 oz)   SpO2 98%   BMI 32.15 kg/mý   Estimated body mass index is 32.15 kg/mý as calculated from the following:    Height as of this encounter: 162.6 cm (64.02\").    Weight as of this encounter: 85 kg (187 lb 6.4 oz).               Physical Exam  Vitals and nursing note reviewed.   Constitutional:       General: He is not in acute distress.     Comments: No acute distress.  Nontoxic.   HENT:      Right Ear: Tympanic membrane, ear canal and external ear normal.      Left Ear: Tympanic membrane, ear canal and external ear normal.      Nose: Nose normal.      Mouth/Throat:      Pharynx: No oropharyngeal exudate.   Eyes:      General: No scleral icterus.        Right eye: No discharge.         Left eye: No discharge.      Conjunctiva/sclera: Conjunctivae normal.   Cardiovascular:      Rate and Rhythm: Normal rate.   Pulmonary:      Effort: Pulmonary effort is normal. No respiratory distress.      Breath sounds: Normal breath sounds. No stridor. No wheezing or rales.   Lymphadenopathy:      Cervical: No cervical adenopathy.   Skin:     Findings: No rash.        Result Review :                   Assessment and " Plan   Diagnoses and all orders for this visit:    1. Bronchitis (Primary)  -     XR Chest PA & Lateral    Other orders  -     fluticasone (Flovent HFA) 110 MCG/ACT inhaler; Inhale 1 puff 2 (Two) Times a Day.  Dispense: 12 g; Refill: 0      Bronchitis.  Postviral versus allergic.  Probably not sinusitis or pneumonia.  However I am checking an x-ray of the chest today.  He can finish the Augmentin.  Prescribing fluticasone inhaler, which may reduce some of the bronchial inflammation.  Holding off on steroids, no wheezing.  Okay for Mucinex DM.  If cough persists 3 more weeks, will need further evaluation including PFTs and possibly CT scan.  And/or pulmonary consultation.  But at this point, should resolve after a few weeks.         Follow Up   No follow-ups on file.  Patient was given instructions and counseling regarding his condition or for health maintenance advice. Please see specific information pulled into the AVS if appropriate.

## 2023-10-16 ENCOUNTER — TELEPHONE (OUTPATIENT)
Dept: PEDIATRICS | Facility: OTHER | Age: 63
End: 2023-10-16

## 2023-10-16 DIAGNOSIS — J40 BRONCHITIS: Primary | ICD-10-CM

## 2023-10-16 RX ORDER — HYDROCODONE POLISTIREX AND CHLORPHENIRAMINE POLISTIREX 10; 8 MG/5ML; MG/5ML
5 SUSPENSION, EXTENDED RELEASE ORAL EVERY 12 HOURS PRN
Qty: 100 ML | Refills: 0 | Status: SHIPPED | OUTPATIENT
Start: 2023-10-16

## 2023-10-16 NOTE — TELEPHONE ENCOUNTER
I have no way of knowing what or what not his insurance will pay for.  Typically all the steroid inhalers are expensive.  There is no absolute indication for the steroid inhaler.  If he is still having trouble, I recommend he seek medical attention with his primary care provider.

## 2023-10-16 NOTE — TELEPHONE ENCOUNTER
Caller: Bebeto Burgess    Relationship: Self    Best call back number: [phone]     What test was performed: CHEST XRAY    When was the test performed: 10/13/23    Where was the test performed: IN OFFICE     Additional notes: PLEASE ADVISE

## 2023-10-16 NOTE — TELEPHONE ENCOUNTER
S/w pt and provider, provider will send in cough syrup, pt will f/u if not better, pt voiced understanding

## 2023-10-17 ENCOUNTER — TELEPHONE (OUTPATIENT)
Dept: FAMILY MEDICINE CLINIC | Facility: CLINIC | Age: 63
End: 2023-10-17

## 2023-10-17 NOTE — TELEPHONE ENCOUNTER
Caller: Bebeto Burgess    Relationship: Self    Best call back number: [phone]     Who are you requesting to speak with (clinical staff, provider,  specific staff member): ITZEL    What was the call regarding: HUB RELAYED MESSAGE. PATIENT WOULD LIKE TO DISCUSS ADDITIONAL QUESTIONS. PLEASE CALL AND ADVISE

## 2023-10-17 NOTE — TELEPHONE ENCOUNTER
Spw pt in detail in regards to results of XR Chest PA & Lateral.      Pt did receive the Hydrocod Conner-Chlorphe Conner ER (TUSSIONEX PENNKINETIC) 10-8 MG/5ML ER suspension  for night but wants to know what he can take during the day that wont knock him out while he's working. Pt is wanting Kelly oCle's opinion

## 2023-10-17 NOTE — TELEPHONE ENCOUNTER
Relay     CALLED PT TO DISCUSS RESULTS OF XR Chest PA & Lateral. NO ANSWER LM TO RETURN CALL.       the chest x-ray overall is not too concerning at this moment.  There are some markings that do not appear to be pneumonia, but the radiologist recommends repeating a chest x-ray in 2 to 3 weeks.  The chest x-ray has been ordered.  If cough persist, follow-up with primary care provider as discussed previously.

## 2023-11-02 ENCOUNTER — OFFICE VISIT (OUTPATIENT)
Dept: FAMILY MEDICINE CLINIC | Facility: CLINIC | Age: 63
End: 2023-11-02
Payer: COMMERCIAL

## 2023-11-02 ENCOUNTER — HOSPITAL ENCOUNTER (OUTPATIENT)
Dept: GENERAL RADIOLOGY | Facility: HOSPITAL | Age: 63
Discharge: HOME | End: 2023-11-02
Admitting: FAMILY MEDICINE
Payer: COMMERCIAL

## 2023-11-02 VITALS
DIASTOLIC BLOOD PRESSURE: 80 MMHG | SYSTOLIC BLOOD PRESSURE: 128 MMHG | OXYGEN SATURATION: 97 % | HEART RATE: 63 BPM | HEIGHT: 64 IN | BODY MASS INDEX: 30.87 KG/M2 | WEIGHT: 180.8 LBS

## 2023-11-02 DIAGNOSIS — Z12.5 SCREENING FOR PROSTATE CANCER: ICD-10-CM

## 2023-11-02 DIAGNOSIS — R05.1 ACUTE COUGH: ICD-10-CM

## 2023-11-02 DIAGNOSIS — Z00.00 ROUTINE GENERAL MEDICAL EXAMINATION AT A HEALTH CARE FACILITY: Primary | ICD-10-CM

## 2023-11-02 DIAGNOSIS — J40 BRONCHITIS: ICD-10-CM

## 2023-11-02 PROCEDURE — 71046 X-RAY EXAM CHEST 2 VIEWS: CPT

## 2023-11-02 PROCEDURE — 99396 PREV VISIT EST AGE 40-64: CPT | Performed by: NURSE PRACTITIONER

## 2023-11-03 ENCOUNTER — TELEPHONE (OUTPATIENT)
Dept: FAMILY MEDICINE CLINIC | Facility: CLINIC | Age: 63
End: 2023-11-03

## 2023-11-03 LAB
ALBUMIN SERPL-MCNC: 4.7 G/DL (ref 3.5–5.2)
ALBUMIN/GLOB SERPL: 2.4 G/DL
ALP SERPL-CCNC: 66 U/L (ref 39–117)
ALT SERPL-CCNC: 20 U/L (ref 1–41)
AST SERPL-CCNC: 19 U/L (ref 1–40)
BASOPHILS # BLD AUTO: 0.03 10*3/MM3 (ref 0–0.2)
BASOPHILS NFR BLD AUTO: 0.8 % (ref 0–1.5)
BILIRUB SERPL-MCNC: 0.6 MG/DL (ref 0–1.2)
BUN SERPL-MCNC: 21 MG/DL (ref 8–23)
BUN/CREAT SERPL: 24.1 (ref 7–25)
CALCIUM SERPL-MCNC: 9.1 MG/DL (ref 8.6–10.5)
CHLORIDE SERPL-SCNC: 109 MMOL/L (ref 98–107)
CHOLEST SERPL-MCNC: 152 MG/DL (ref 0–200)
CO2 SERPL-SCNC: 26.5 MMOL/L (ref 22–29)
CREAT SERPL-MCNC: 0.87 MG/DL (ref 0.76–1.27)
EGFRCR SERPLBLD CKD-EPI 2021: 98.2 ML/MIN/1.73
EOSINOPHIL # BLD AUTO: 0.12 10*3/MM3 (ref 0–0.4)
EOSINOPHIL NFR BLD AUTO: 3.4 % (ref 0.3–6.2)
ERYTHROCYTE [DISTWIDTH] IN BLOOD BY AUTOMATED COUNT: 12.2 % (ref 12.3–15.4)
GLOBULIN SER CALC-MCNC: 2 GM/DL
GLUCOSE SERPL-MCNC: 108 MG/DL (ref 65–99)
HBA1C MFR BLD: 5.6 % (ref 4.8–5.6)
HCT VFR BLD AUTO: 43.5 % (ref 37.5–51)
HDLC SERPL-MCNC: 50 MG/DL (ref 40–60)
HGB BLD-MCNC: 14.8 G/DL (ref 13–17.7)
IMM GRANULOCYTES # BLD AUTO: 0.01 10*3/MM3 (ref 0–0.05)
IMM GRANULOCYTES NFR BLD AUTO: 0.3 % (ref 0–0.5)
LDLC SERPL CALC-MCNC: 89 MG/DL (ref 0–100)
LDLC/HDLC SERPL: 1.79 {RATIO}
LYMPHOCYTES # BLD AUTO: 0.88 10*3/MM3 (ref 0.7–3.1)
LYMPHOCYTES NFR BLD AUTO: 24.6 % (ref 19.6–45.3)
MCH RBC QN AUTO: 30.5 PG (ref 26.6–33)
MCHC RBC AUTO-ENTMCNC: 34 G/DL (ref 31.5–35.7)
MCV RBC AUTO: 89.5 FL (ref 79–97)
MONOCYTES # BLD AUTO: 0.32 10*3/MM3 (ref 0.1–0.9)
MONOCYTES NFR BLD AUTO: 9 % (ref 5–12)
NEUTROPHILS # BLD AUTO: 2.21 10*3/MM3 (ref 1.7–7)
NEUTROPHILS NFR BLD AUTO: 61.9 % (ref 42.7–76)
NRBC BLD AUTO-RTO: 0 /100 WBC (ref 0–0.2)
PLATELET # BLD AUTO: 158 10*3/MM3 (ref 140–450)
POTASSIUM SERPL-SCNC: 4.5 MMOL/L (ref 3.5–5.2)
PROT SERPL-MCNC: 6.7 G/DL (ref 6–8.5)
PSA SERPL-MCNC: 1.42 NG/ML (ref 0–4)
RBC # BLD AUTO: 4.86 10*6/MM3 (ref 4.14–5.8)
SODIUM SERPL-SCNC: 142 MMOL/L (ref 136–145)
TRIGL SERPL-MCNC: 63 MG/DL (ref 0–150)
TSH SERPL DL<=0.005 MIU/L-ACNC: 1.87 UIU/ML (ref 0.27–4.2)
VLDLC SERPL CALC-MCNC: 13 MG/DL (ref 5–40)
WBC # BLD AUTO: 3.57 10*3/MM3 (ref 3.4–10.8)

## 2023-11-03 NOTE — TELEPHONE ENCOUNTER
Relay   Called pt to discuss results no answer lm   The chest x-ray shows a persistent area of opacification.  Not worse.  The radiologist recommends a 8-week follow-up.  Instead, I recommend the patient see his primary care provider in the next week or 2 to formulate plan.  And possible further evaluation.

## 2023-11-06 DIAGNOSIS — R05.1 ACUTE COUGH: Primary | ICD-10-CM

## 2023-12-15 ENCOUNTER — HOSPITAL ENCOUNTER (OUTPATIENT)
Dept: GENERAL RADIOLOGY | Facility: HOSPITAL | Age: 63
Discharge: HOME | End: 2023-12-15
Admitting: NURSE PRACTITIONER
Payer: COMMERCIAL

## 2023-12-15 DIAGNOSIS — R05.1 ACUTE COUGH: ICD-10-CM

## 2023-12-15 PROCEDURE — 71046 X-RAY EXAM CHEST 2 VIEWS: CPT

## 2024-05-24 ENCOUNTER — OFFICE VISIT (OUTPATIENT)
Dept: FAMILY MEDICINE CLINIC | Facility: CLINIC | Age: 64
End: 2024-05-24
Payer: COMMERCIAL

## 2024-05-24 VITALS
OXYGEN SATURATION: 97 % | HEIGHT: 64 IN | BODY MASS INDEX: 31.4 KG/M2 | DIASTOLIC BLOOD PRESSURE: 86 MMHG | WEIGHT: 183.9 LBS | HEART RATE: 64 BPM | RESPIRATION RATE: 14 BRPM | SYSTOLIC BLOOD PRESSURE: 149 MMHG | TEMPERATURE: 98.2 F

## 2024-05-24 DIAGNOSIS — K11.20 SIALADENITIS: Primary | ICD-10-CM

## 2024-05-24 PROCEDURE — 99213 OFFICE O/P EST LOW 20 MIN: CPT | Performed by: NURSE PRACTITIONER

## 2024-05-24 NOTE — PROGRESS NOTES
"Chief Complaint  Mass (Pt states lump found on side of face near ear)    Subjective        Bebeto Burgess presents to [facility]  History of Present Illness    History of Present Illness  The patient is a 62-year-old male who presents for evaluation of a bump on his face.    The patient reports the presence of a small bump on his face, first noticed last Saturday morning. He experiences mild discomfort upon palpation, which radiates into his jaw. Despite the discomfort, the patient maintains an active lifestyle, working throughout the week, and refrains from yard work for the past 3 to 4 days. He does not suspect a tick bite. He denies any dysphagia or otalgia, and reports no drainage in his mouth. He also denies any fever or chills. He has been self-medicating with intermittent doses of ibuprofen during the day and Benadryl at night, as a precautionary measure against an insect bite. He has a chronic history of postnasal drip and a persistent scratchy cough. He has not applied heat to the affected area. The patient reports that the bump is less tender in the mornings.    The patient's blood pressure was elevated during today's visit, which he attributes to nervousness. He does not possess a home blood pressure monitor.       Objective   Vital Signs:  /86   Pulse 64   Temp 98.2 °F (36.8 °C) (Temporal)   Resp 14   Ht 162.6 cm (64.02\")   Wt 83.4 kg (183 lb 14.4 oz)   SpO2 97%   BMI 31.55 kg/m²   Estimated body mass index is 31.55 kg/m² as calculated from the following:    Height as of this encounter: 162.6 cm (64.02\").    Weight as of this encounter: 83.4 kg (183 lb 14.4 oz).               Physical Exam     Physical Exam       Result Review :            Results                  Assessment and Plan     Diagnoses and all orders for this visit:    1. Sialadenitis (Primary)        Assessment & Plan  1. Inflammation of the left parotid gland.  The patient's symptoms suggest a " blockage of the salivary gland. The patient has been advised to apply a heating pad for 20 to 30 minutes and to consume sour lemon candy to alleviate the salivation. Additionally, he has been advised to perform parotid massages. Should his symptoms worsen, an antibiotic prescription will be issued. If the inflammation persists, a short course of steroids will be considered.    2. Elevated blood pressure.  The patient has been advised to acquire a blood pressure cuff and monitor his blood pressure at home. He has been instructed to remain seated for a duration of 5 minutes prior to checking his blood pressure.            Follow Up     No follow-ups on file.  Patient was given instructions and counseling regarding his condition or for health maintenance advice. Please see specific information pulled into the AVS if appropriate.    Patient or patient representative verbalized consent for the use of Ambient Listening during the visit with  JAVIER Martin for chart documentation. 5/28/2024  10:27 EDT      Answers submitted by the patient for this visit:  Primary Reason for Visit (Submitted on 5/22/2024)  What is the primary reason for your visit?: Other  Other (Submitted on 5/22/2024)  Please describe your symptoms.: Small, marble sized bump on left check.  Very minor pain/ discomfort in left jaw.  Have you had these symptoms before?: No  How long have you been having these symptoms?: 1-4 days  Please list any medications you are currently taking for this condition.: Ibuprofen snd benadryl at night.  Please describe any probable cause for these symptoms. : I suspect a bug bite but there’s not a jolene visible.

## 2024-05-28 DIAGNOSIS — K11.20 SIALADENITIS: Primary | ICD-10-CM

## 2024-05-28 RX ORDER — CEPHALEXIN 500 MG/1
500 CAPSULE ORAL 4 TIMES DAILY
Qty: 28 CAPSULE | Refills: 0 | Status: SHIPPED | OUTPATIENT
Start: 2024-05-28 | End: 2024-06-04

## 2024-06-03 DIAGNOSIS — K11.20 SIALADENITIS: Primary | ICD-10-CM

## 2024-11-05 ENCOUNTER — OFFICE VISIT (OUTPATIENT)
Dept: FAMILY MEDICINE CLINIC | Facility: CLINIC | Age: 64
End: 2024-11-05
Payer: COMMERCIAL

## 2024-11-05 VITALS
HEIGHT: 64 IN | DIASTOLIC BLOOD PRESSURE: 78 MMHG | BODY MASS INDEX: 30.05 KG/M2 | HEART RATE: 54 BPM | TEMPERATURE: 97.1 F | RESPIRATION RATE: 12 BRPM | SYSTOLIC BLOOD PRESSURE: 140 MMHG | OXYGEN SATURATION: 98 % | WEIGHT: 176 LBS

## 2024-11-05 DIAGNOSIS — M25.511 ACUTE PAIN OF RIGHT SHOULDER: ICD-10-CM

## 2024-11-05 DIAGNOSIS — Z12.11 SCREEN FOR COLON CANCER: ICD-10-CM

## 2024-11-05 DIAGNOSIS — Z12.5 SCREENING FOR MALIGNANT NEOPLASM OF PROSTATE: ICD-10-CM

## 2024-11-05 DIAGNOSIS — Z00.00 ROUTINE GENERAL MEDICAL EXAMINATION AT A HEALTH CARE FACILITY: Primary | ICD-10-CM

## 2024-11-05 PROCEDURE — 99396 PREV VISIT EST AGE 40-64: CPT | Performed by: NURSE PRACTITIONER

## 2024-11-05 NOTE — PROGRESS NOTES
Chief Complaint  Annual Exam    Subjective        Bebeto Burgess presents to [facility]  History of Present Illness    History of Present Illness  The patient is a 64-year-old male who presents for a physical.    He reports a weight loss of 7 pounds since his last visit, attributing this to improved dietary habits and increased physical activity, including walking and yard work. He reports no anxiety or depression and has good sleep quality. He mentions a missed colonoscopy from the previous year due to a bout of pneumonia. He also reports a cough, which he believes may be due to exposure to mold while raking leaves. He is not experiencing shortness of breath, chest pain, heart palpitations, abdominal issues, nausea, vomiting, diarrhea, changes in stool characteristics, acid reflux, ear or throat problems, or difficulty swallowing. He maintains regular dental check-ups and annual dermatologist visits. He reports no urinary issues, blood in urine, or frequent urination at night. He reports no testicular pain, swelling, unexpected loss of libido, or erectile dysfunction. He does not monitor his blood pressure at home and admits to consuming a cup of black coffee prior to the visit.    He reports a significant issue with his right shoulder, which he believes stated appr 2 months ago when he was digging hard ground and had sudden onset of pain in right shoulder.  Had a previous bicep injury and subsequent rotator cuff repair performed by Dr. Andreas Zelaya. He describes a constant ache in the right anterior shoulder and shoulder, which intensifies with certain movements, such as brushing his teeth or reaching for objects. Moving arm suddenly exacerbates pain.  He is very active and has babied it a little but not really stopped activity. He also reports tenderness in the biceps and pain radiating to the elbow and forearm. Wakes him up at night.  He has been managing the pain with  "ibuprofen and a topical cream provided by his wife. He has not been applying ice to the area. He expresses a desire to return to Dr. Zelaya for further evaluation and treatment.    DIOGENES-7 Score: DIOGENES 7 Total Score: 1  PHQ-9 Total Score: 0    SOCIAL HISTORY  He is retired.    FAMILY HISTORY  His mother is 94 years old and lives alone. She has arthritis.    IMMUNIZATIONS  He is up to date on his influenza, shingles, and tetanus vaccines.       Objective   Vital Signs:  /78   Pulse 54   Temp 97.1 °F (36.2 °C) (Temporal)   Resp 12   Ht 162.6 cm (64.02\")   Wt 79.8 kg (176 lb)   SpO2 98%   BMI 30.19 kg/m²   Estimated body mass index is 30.19 kg/m² as calculated from the following:    Height as of this encounter: 162.6 cm (64.02\").    Weight as of this encounter: 79.8 kg (176 lb).       BMI is >= 30 and <35. (Class 1 Obesity). The following options were offered after discussion;: Information on healthy weight added to patient's after visit summary.      Physical Exam  Vitals and nursing note reviewed.   Constitutional:       General: He is not in acute distress.     Appearance: He is well-developed. He is not ill-appearing.   HENT:      Head: Normocephalic and atraumatic.      Right Ear: Tympanic membrane, ear canal and external ear normal.      Left Ear: Tympanic membrane, ear canal and external ear normal.      Mouth/Throat:      Mouth: Mucous membranes are moist.      Pharynx: Uvula midline. No posterior oropharyngeal erythema.   Eyes:      General: No scleral icterus.        Right eye: No discharge.         Left eye: No discharge.      Conjunctiva/sclera: Conjunctivae normal.   Neck:      Thyroid: No thyromegaly.   Cardiovascular:      Rate and Rhythm: Normal rate and regular rhythm.      Heart sounds: Normal heart sounds. No murmur heard.  Pulmonary:      Effort: Pulmonary effort is normal.      Breath sounds: Normal breath sounds. No wheezing.   Abdominal:      General: Bowel sounds are normal. There is no " distension.      Palpations: Abdomen is soft. There is no mass.      Tenderness: There is no abdominal tenderness.      Hernia: No hernia is present.   Musculoskeletal:         General: No deformity.      Right shoulder: Tenderness present. No deformity, bony tenderness or crepitus. Decreased range of motion.      Cervical back: Neck supple.      Comments: Pain with elevation > 90 degrees, internal rotation  Focal anterior TTP with tenderness into bicep tendon   Lymphadenopathy:      Cervical: No cervical adenopathy.   Skin:     General: Skin is warm and dry.   Neurological:      General: No focal deficit present.      Mental Status: He is alert and oriented to person, place, and time.   Psychiatric:         Mood and Affect: Mood normal.         Behavior: Behavior normal.         Thought Content: Thought content normal.      Comments: Very pleasant, conversant, engaged          Physical Exam  Heart sounds are normal.    Vital Signs  Weight is 176.     Result Review :            Results                  Assessment and Plan     Diagnoses and all orders for this visit:    1. Routine general medical examination at a health care facility (Primary)  -     CBC (No Diff)  -     Comprehensive Metabolic Panel  -     Hemoglobin A1c  -     Lipid Panel With LDL / HDL Ratio  -     TSH Rfx On Abnormal To Free T4  -     PSA Screen    2. Acute pain of right shoulder  -     Ambulatory Referral to Orthopedic Surgery    3. Screening for malignant neoplasm of prostate  -     PSA Screen    4. Screen for colon cancer  -     Ambulatory Referral For Screening Colonoscopy        Assessment & Plan  1. Routine Physical Examination.  His blood pressure was slightly elevated during today's visit. He mentioned having a cup of black coffee before the appointment, which might have impacted the reading. Blood pressure rechecked and was improved but still has SBP mild elevation. Recommend home monitoring to make sure at goal 130/80 or less. He is up  to date on flu and shingles vaccines. His tetanus shot was administered on 10/23/2023.    2. Right Shoulder Pain.  He reports persistent pain in his right shoulder, exacerbated by certain movements and activities. The pain radiates to his elbow and forearm. He has a history of bicep surgery and rotator cuff repair. He was advised to apply an ice pack to his shoulder for 30 minutes while seated and to take ibuprofen with food as needed. A referral to Dr. Andreas Zelaya has been made. If there is a delay in securing an appointment with Dr. Zelaya, he will inform the office  so that an interim consultation with sports medicine can be arranged. An MRI will likely be considered by ortho, and sedation may be necessary due to his claustrophobia.    3. Health Maintenance.  A colonoscopy is due as his last one was 11 years ago. He will schedule the procedure for early next year. Lab work has also been ordered.     Appropriate health maintenance and prevention topics specific for this patient were discussed today.  Additionally, health goals, and health concerns addressed as appropriate.  Pt was encouraged to stay up to date on recommended screenings and vaccines based on USPSTF guidelines.            Follow Up     No follow-ups on file.  Patient was given instructions and counseling regarding his condition or for health maintenance advice. Please see specific information pulled into the AVS if appropriate.    Patient or patient representative verbalized consent for the use of Ambient Listening during the visit with  JAVIER Martin for chart documentation. 11/5/2024  10:20 EST

## 2024-11-06 LAB
ALBUMIN SERPL-MCNC: 4.3 G/DL (ref 3.5–5.2)
ALBUMIN/GLOB SERPL: 1.8 G/DL
ALP SERPL-CCNC: 75 U/L (ref 39–117)
ALT SERPL-CCNC: 17 U/L (ref 1–41)
AST SERPL-CCNC: 18 U/L (ref 1–40)
BILIRUB SERPL-MCNC: 0.5 MG/DL (ref 0–1.2)
BUN SERPL-MCNC: 25 MG/DL (ref 8–23)
BUN/CREAT SERPL: 29.8 (ref 7–25)
CALCIUM SERPL-MCNC: 8.9 MG/DL (ref 8.6–10.5)
CHLORIDE SERPL-SCNC: 108 MMOL/L (ref 98–107)
CHOLEST SERPL-MCNC: 145 MG/DL (ref 0–200)
CO2 SERPL-SCNC: 26.3 MMOL/L (ref 22–29)
CREAT SERPL-MCNC: 0.84 MG/DL (ref 0.76–1.27)
EGFRCR SERPLBLD CKD-EPI 2021: 98.6 ML/MIN/1.73
ERYTHROCYTE [DISTWIDTH] IN BLOOD BY AUTOMATED COUNT: 11.9 % (ref 12.3–15.4)
GLOBULIN SER CALC-MCNC: 2.4 GM/DL
GLUCOSE SERPL-MCNC: 102 MG/DL (ref 65–99)
HBA1C MFR BLD: 5.6 % (ref 4.8–5.6)
HCT VFR BLD AUTO: 41.9 % (ref 37.5–51)
HDLC SERPL-MCNC: 49 MG/DL (ref 40–60)
HGB BLD-MCNC: 14.4 G/DL (ref 13–17.7)
LDLC SERPL CALC-MCNC: 84 MG/DL (ref 0–100)
LDLC/HDLC SERPL: 1.72 {RATIO}
MCH RBC QN AUTO: 31.4 PG (ref 26.6–33)
MCHC RBC AUTO-ENTMCNC: 34.4 G/DL (ref 31.5–35.7)
MCV RBC AUTO: 91.5 FL (ref 79–97)
PLATELET # BLD AUTO: 147 10*3/MM3 (ref 140–450)
POTASSIUM SERPL-SCNC: 4.1 MMOL/L (ref 3.5–5.2)
PROT SERPL-MCNC: 6.7 G/DL (ref 6–8.5)
PSA SERPL-MCNC: 1.29 NG/ML (ref 0–4)
RBC # BLD AUTO: 4.58 10*6/MM3 (ref 4.14–5.8)
SODIUM SERPL-SCNC: 143 MMOL/L (ref 136–145)
TRIGL SERPL-MCNC: 59 MG/DL (ref 0–150)
TSH SERPL DL<=0.005 MIU/L-ACNC: 1.69 UIU/ML (ref 0.27–4.2)
VLDLC SERPL CALC-MCNC: 12 MG/DL (ref 5–40)
WBC # BLD AUTO: 3.55 10*3/MM3 (ref 3.4–10.8)

## 2025-01-02 ENCOUNTER — OFFICE VISIT (OUTPATIENT)
Dept: ORTHOPEDIC SURGERY | Facility: CLINIC | Age: 65
End: 2025-01-02
Payer: COMMERCIAL

## 2025-01-02 VITALS
DIASTOLIC BLOOD PRESSURE: 66 MMHG | SYSTOLIC BLOOD PRESSURE: 126 MMHG | WEIGHT: 180 LBS | BODY MASS INDEX: 27.28 KG/M2 | HEIGHT: 68 IN | HEART RATE: 74 BPM

## 2025-01-02 DIAGNOSIS — M25.511 RIGHT SHOULDER PAIN, UNSPECIFIED CHRONICITY: ICD-10-CM

## 2025-01-02 DIAGNOSIS — M75.52 SUBACROMIAL BURSITIS OF BOTH SHOULDERS: ICD-10-CM

## 2025-01-02 DIAGNOSIS — M75.81 TENDINITIS OF RIGHT ROTATOR CUFF: ICD-10-CM

## 2025-01-02 DIAGNOSIS — M25.512 LEFT SHOULDER PAIN, UNSPECIFIED CHRONICITY: Primary | ICD-10-CM

## 2025-01-02 DIAGNOSIS — M75.51 SUBACROMIAL BURSITIS OF BOTH SHOULDERS: ICD-10-CM

## 2025-01-02 DIAGNOSIS — R29.898 WEAKNESS OF RIGHT SHOULDER: ICD-10-CM

## 2025-01-02 PROCEDURE — 99203 OFFICE O/P NEW LOW 30 MIN: CPT | Performed by: ORTHOPAEDIC SURGERY

## 2025-01-02 NOTE — PROGRESS NOTES
Subjective:     Patient ID: Bebeto Burgess is a 64 y.o. male.    Chief Complaint:  Bilateral shoulder pain, new patient  History of Present Illness  History of Present Illness  The patient presents to the clinic today for evaluation of bilateral shoulder pain, right greater than left.    He reports a significant worsening of his bilateral shoulder pain over the past 4 to 5 months. The onset of this exacerbation was in 08/2024, following an incident where he experienced resistance and a forceful push while using a shovel, leading to immediate pain in his right shoulder. Since then, he has been experiencing severe night pain accompanied by painful crepitus. He also reports mild irritation in his left shoulder, primarily localized to the anterior and lateral subacromial space. The pain intensifies with overhead lifting, pushing, and pulling activities, and shows minimal improvement with rest. He does not experience any numbness or tingling. He rates his current pain level as 5 to 6 out of 10, describing it as aching and burning in nature. There has been minimal relief with heat, rest, and occasional use of anti-inflammatory medications such as ibuprofen. He does not experience any fevers, chills, or sweats. The pain does not radiate below the arm, but he does note some radiation down the lateral and anterior aspect of the arm, particularly on the right side. The pain intensifies with overhead lifting, pushing, and pulling activities, and shows minimal improvement with rest. There has been minimal relief with heat, rest, and occasional use of anti-inflammatory medications such as ibuprofen. Occasional use of anti-inflammatory medications such as ibuprofen. He has a history of biceps and rotator cuff repair on the right side in 2013.    MEDICATIONS  ibuprofen     Social History     Occupational History    Not on file   Tobacco Use    Smoking status: Never     Passive exposure: Never    Smokeless tobacco: Former  "  Vaping Use    Vaping status: Never Used   Substance and Sexual Activity    Alcohol use: No    Drug use: No    Sexual activity: Yes     Partners: Female     Birth control/protection: Vasectomy      History reviewed. No pertinent past medical history.  Past Surgical History:   Procedure Laterality Date    SHOULDER SURGERY  2015       Family History   Problem Relation Age of Onset    Arthritis Mother     Prostate cancer Father     Cancer Father         Prostate    Diabetes Sister     Heart failure Sister     Mental illness Sister     Hypertension Sister     Prostate cancer Brother     Cancer Brother         Prostate    Alcohol abuse Maternal Grandfather     Alcohol abuse Maternal Uncle          Review of Systems        Objective:  Vitals:    01/02/25 0947   BP: 126/66   Pulse: 74   Weight: 81.6 kg (180 lb)   Height: 172.7 cm (68\")         01/02/25  0947   Weight: 81.6 kg (180 lb)     Body mass index is 27.37 kg/m².  Physical Exam    Vital signs reviewed.   General: No acute distress, alert and oriented  Eyes: conjunctiva clear; pupils equally round and reactive  ENT: external ears and nose atraumatic; oropharynx clear  CV: no peripheral edema  Resp: normal respiratory effort  Skin: no rashes or wounds; normal turgor  Psych: mood and affect appropriate; recent and remote memory intact          Physical Exam  The right shoulder shows active forward flexion of 165 degrees with 4- out of 5 strength, active external rotation of 45 degrees, 4- out of 5 strength, internal rotation to T10, 5 out of 5 strength. Belly press test is negative. Bear hug sign is positive. Meyer, Neer's, and empty can tests are positive. Drop arm test and external rotation lag sign are negative. Deltoid is positive in all 3 components. Prior incisions are well healed. Brisk capillary refill to all digits. Radial pulse is 2+.     The left shoulder shows active forward flexion of 175 degrees with 4+ out of 5 strength, external rotation of 55 " degrees, 4 out of 5 strength, internal rotation to T10, 5 out of 5 strength. Belly press test is negative. Bear hug sign is positive. Empty can test is mildly positive. Meyer and Neer's tests are positive. Drop arm test and external rotation lag sign are negative. Deltoid is positive in all components. Mild tenderness along the anterior and posterior glenohumeral joint line with minimal crepitus. Brisk capillary refill to digits. Radial pulse is 2+.         Imaging:  Bilateral shoulder X-Ray  Indication: Pain  AP, scapular Y, and axillary lateral views    Findings:  No fracture  No bony lesion  Normal soft tissues  Moderate glenohumeral joint space narrowing more significant on the right than the left with mild humeral head elevation on the right minimal humeral head elevation on the left  No prior studies were available for comparison.    Assessment:        1. Left shoulder pain, unspecified chronicity    2. Right shoulder pain, unspecified chronicity    3. Weakness of right shoulder    4. Tendinitis of right rotator cuff    5. Subacromial bursitis of both shoulders           Plan:          Assessment & Plan  1. Bilateral shoulder pain, right greater than left.  He has failed to improve with a home exercise program, which he has done for greater than 12 weeks with significant pain with prior history of rotator cuff repair and clinical evidence of weakness based on exam today. A right shoulder MRI will be conducted to further evaluate the condition. He is advised to continue with home exercises for stretching, strengthening, and mobilization to the extent possible until the follow-up visit. The left shoulder will be treated conservatively at this point in time. Follow up with him to review results of MRI right shoulder and discuss further treatment options.    PROCEDURE  The patient underwent biceps and rotator cuff repair on the right side in 2013.    Bebeto Burgess was in agreement with plan and had all  questions answered.     Orders:  Orders Placed This Encounter   Procedures    XR Shoulder 2+ View Right    XR Shoulder 2+ View Left    MRI Shoulder Right Without Contrast       Medications:  No orders of the defined types were placed in this encounter.      Followup:  Return for review of MRI results.    Diagnoses and all orders for this visit:    1. Left shoulder pain, unspecified chronicity (Primary)  -     XR Shoulder 2+ View Left    2. Right shoulder pain, unspecified chronicity  -     XR Shoulder 2+ View Right  -     MRI Shoulder Right Without Contrast; Future    3. Weakness of right shoulder  -     MRI Shoulder Right Without Contrast; Future    4. Tendinitis of right rotator cuff    5. Subacromial bursitis of both shoulders                  Dictated utilizing Dragon dictation     Patient or patient representative verbalized consent for the use of Ambient Listening during the visit with  Andreas Barrera MD for chart documentation. 1/2/2025  10:06 EST

## 2025-01-13 ENCOUNTER — TELEPHONE (OUTPATIENT)
Dept: ORTHOPEDIC SURGERY | Facility: CLINIC | Age: 65
End: 2025-01-13

## 2025-01-13 NOTE — TELEPHONE ENCOUNTER
Caller: Bebeto Burgess    Relationship: Self    Best call back number:     What form or medical record are you requesting: ORDER FOR RIGHT SHOULDER MRI     Who is requesting this form or medical record from you: [facility]     How would you like to receive the form or medical records (pick-up, mail, fax): FAX    Timeframe paperwork needed: ASAP    Additional notes: PLEASE FAX MRI ORDERS FOR RT SHOULDER OVER TO [facility] FOR PATIENT

## 2025-01-14 NOTE — TELEPHONE ENCOUNTER
Provider: DR CORONADO    Caller: Bebeto Burgess    Relationship to Patient: Self    Phone Number: [phone]    Reason for Call: PATIENT STATES [facility] HAS HIS MRI ORDER BUT ALSO STATED THEY HAVE ORDERS FOR BILATERAL SHOULDER XRAYS AS WELL. HE HAD XRAYS WITH DR CORONADO ON 1-2-25 AND WANTS TO CONFIRM IF THESE ARE NEEDED AGAIN OR NOT.        Anesthesia Volume In Cc (Will Not Render If 0): 1 Size Of Lesion In Cm: 0 Destruction After The Procedure: No Biopsy Type: H and E Type Of Destruction Used: Silver Nitrate Biopsy Method: Personna blade Dressing: pressure dressing with telfa Lab: [id number] Wound Care: Vaseline Lab Facility: [id number] Notification Instructions: Patient will be notified of biopsy results. However, patient instructed to call the office if not contacted within 2 weeks. Billing Type: Third-Party Bill Anesthesia Type: 1% lidocaine with epinephrine Hemostasis: Drysol Post-Care Instructions: I reviewed with the patient in detail post-care instructions. Patient is to keep the biopsy site dry overnight, and then apply bacitracin twice daily until healed. Patient may apply hydrogen peroxide soaks to remove any crusting. Detail Level: Detailed Consent: verbal consent was obtained and risks were reviewed including but not limited to scarring, infection, bleeding, scabbing, incomplete removal, nerve damage and allergy to anesthesia.

## 2025-02-03 ENCOUNTER — TELEPHONE (OUTPATIENT)
Dept: FAMILY MEDICINE CLINIC | Facility: CLINIC | Age: 65
End: 2025-02-03
Payer: COMMERCIAL

## 2025-02-03 NOTE — TELEPHONE ENCOUNTER
Hub staff attempted to follow warm transfer process and was unsuccessful     Caller: Jessica Burgess    Relationship to patient: Emergency Contact    Best call back number: [phone]     Patient is needing: PATIENT HAD A MISSED CALL FROM REGINA

## 2025-02-03 NOTE — TELEPHONE ENCOUNTER
LVM for pt to call back to schedule a ER follow up. Pt requested appt online due to constipation and trouble urinating.

## 2025-02-03 NOTE — TELEPHONE ENCOUNTER
Called in to ask if they can start the Mirilax purge today. Advised with Keyonna. She stated that since ER advised pt to do that, it would be ok and that pt can see Kelsey tomorrow. Voiced understanding.

## 2025-02-04 ENCOUNTER — OFFICE VISIT (OUTPATIENT)
Dept: FAMILY MEDICINE CLINIC | Facility: CLINIC | Age: 65
End: 2025-02-04
Payer: COMMERCIAL

## 2025-02-04 VITALS
SYSTOLIC BLOOD PRESSURE: 133 MMHG | OXYGEN SATURATION: 98 % | HEART RATE: 73 BPM | WEIGHT: 176 LBS | BODY MASS INDEX: 26.67 KG/M2 | HEIGHT: 68 IN | TEMPERATURE: 97.5 F | DIASTOLIC BLOOD PRESSURE: 79 MMHG

## 2025-02-04 DIAGNOSIS — K59.00 CONSTIPATION, UNSPECIFIED CONSTIPATION TYPE: ICD-10-CM

## 2025-02-04 DIAGNOSIS — R30.0 DYSURIA: Primary | ICD-10-CM

## 2025-02-04 DIAGNOSIS — K64.9 HEMORRHOIDS, UNSPECIFIED HEMORRHOID TYPE: ICD-10-CM

## 2025-02-04 PROCEDURE — 99214 OFFICE O/P EST MOD 30 MIN: CPT | Performed by: NURSE PRACTITIONER

## 2025-02-04 RX ORDER — DICYCLOMINE HCL 20 MG
20 TABLET ORAL
COMMUNITY
Start: 2025-02-02

## 2025-02-04 RX ORDER — MELOXICAM 7.5 MG/1
7.5 TABLET ORAL DAILY
COMMUNITY
Start: 2025-02-02

## 2025-02-04 NOTE — PROGRESS NOTES
Chief Complaint  Hospital Follow Up Visit (Constipation )    Subjective        Bebeto Burgess presents to [facility]  History of Present Illness    History of Present Illness  The patient presents for evaluation of constipation, urinary retention, and hemorrhoids.    He reports a recent episode of constipation that occurred during a trip to [place]. Despite feeling well throughout the day, he experienced difficulty initiating a bowel movement at night. He had urinated earlier that evening but was unable to defecate even after several hours of intermittent attempts. He describes the sensation as similar to diarrhea, with dripping and oozing, and reports straining significantly.  His first bowel movement after the enema was firm, and he then had three loose, watery stools. He has been adhering to a bowel prep regimen, which he believes has effectively emptied his bowels. He is uncertain about the cause of his severe constipation. Prior to this episode, he had regular bowel movements, typically once or twice daily, with occasional hard stools. He has no history of loose bowel movements until the recent episode on Saturday. He has been maintaining normal hydration and nutrition prior to this episode, consuming approximately three bottles of water daily. He has no history of kidney stones. He has not yet undergone a colonoscopy and is seeking advice on how to proceed-has turned in paperwork and not been scheduled.    He also reports urinary retention, with only a few instances of incomplete bladder emptying yesterday, primarily during hot showers. However, following a successful bowel movement last night, his urinary function has improved. He continues to exercise caution when attempting to fully empty his bladder. He sought medical advice from [facility], where he was advised to visit the hospital due to his urinary difficulties. At the hospital, he underwent blood tests, an MRI, and  "a bladder scan, all of which were normal except for the MRI, which revealed significant stool accumulation. He was administered two enemas, which provided temporary relief. Since his emergency room visit, he has consumed two pieces of banana bread, two tortillas with chicken and cheese, half a cup of chocolate pudding, and MiraLAX. He expresses fear of eating due to concerns about further constipation. He reports no previous urinary issues and normal urination. He typically wakes up two to three times at night to urinate, but this can vary depending on his comfort level and sleep quality. He reports no hematuria but notes that his urine has been particularly yellow due to decreased fluid intake. He has been monitoring his urine color, which has been consistently yellow.  No apparent prostate disease but is symptomatic for BPH.    He has been using Preparation H for his hemorrhoids and reports minimal bleeding, which he attributes to the hemorrhoid. He felt a sensation of sitting on a large lump on Sunday night and yesterday.    MEDICATIONS         Objective   Vital Signs:  /79   Pulse 73   Temp 97.5 °F (36.4 °C) (Temporal)   Ht 172.7 cm (68\")   Wt 79.8 kg (176 lb)   SpO2 98%   BMI 26.76 kg/m²   Estimated body mass index is 26.76 kg/m² as calculated from the following:    Height as of this encounter: 172.7 cm (68\").    Weight as of this encounter: 79.8 kg (176 lb).               Physical Exam  Vitals and nursing note reviewed.   Constitutional:       General: He is not in acute distress.     Appearance: He is well-developed. He is not ill-appearing or diaphoretic.   HENT:      Head: Normocephalic and atraumatic.   Eyes:      General:         Right eye: No discharge.         Left eye: No discharge.      Conjunctiva/sclera: Conjunctivae normal.   Cardiovascular:      Rate and Rhythm: Normal rate and regular rhythm.   Pulmonary:      Effort: Pulmonary effort is normal.      Breath sounds: Normal breath " sounds.   Abdominal:      General: Bowel sounds are normal. There is no distension.      Palpations: Abdomen is soft. There is no mass.      Tenderness: There is no abdominal tenderness. There is no right CVA tenderness, left CVA tenderness, guarding or rebound.      Hernia: No hernia is present.   Genitourinary:     Rectum: Tenderness and external hemorrhoid present. No mass.   Musculoskeletal:         General: No deformity.      Comments: Gait smooth and steady   Skin:     General: Skin is warm and dry.   Neurological:      Mental Status: He is alert and oriented to person, place, and time.   Psychiatric:         Mood and Affect: Mood normal.         Behavior: Behavior normal.          Physical Exam  Abdomen is soft. No hard stool is felt in the abdomen.  A large hemorrhoid is present in the genitourinary area.     Result Review :            Results                  Assessment and Plan     Diagnoses and all orders for this visit:    1. Dysuria (Primary)  -     Urinalysis With Culture If Indicated - Urine, Clean Catch  -     CBC (No Diff)  -     Comprehensive Metabolic Panel  -     PSA DIAGNOSTIC ONLY    2. Constipation, unspecified constipation type    3. Hemorrhoids, unspecified hemorrhoid type        Assessment & Plan  1. Constipation.  The patient reports a recent episode of severe constipation, leading to an inability to have a bowel movement for an extended period. He was given an enema at the ER, which provided some relief. He has been using MiraLAX as part of a bowel prep regimen. He is advised to discontinue the bowel prep now that he feels empty. A request for his medical records and CT scan will be made to further evaluate his condition    2. Urinary retention.  The patient experienced difficulty urinating, which improved after a bowel movement. He was able to urinate more effectively post-defecation. A urine test will be conducted to rule out any underlying issues. A PSA test will also be ordered to  check for potential prostate inflammation.    3. Hemorrhoids.  The patient has a significant hemorrhoid, likely exacerbated by straining during bowel movements. He is advised to avoid wiping the area and instead rinse with warm water using a mckenzie bottle or shower wand. He should avoid straining during bowel movements and use topical treatments, such as Preparation H, a few times daily. A stronger topical with numbing properties from a compound pharmacy can be prescribed if needed.    PROCEDURE  The patient received two enemas during a recent emergency room visit, which provided temporary relief.          I spent 30 minutes caring for Bebeto on this date of service. This time includes time spent by me in the following activities:preparing for the visit, obtaining and/or reviewing a separately obtained history, performing a medically appropriate examination and/or evaluation , counseling and educating the patient/family/caregiver, ordering medications, tests, or procedures, and documenting information in the medical record  Follow Up     No follow-ups on file.  Patient was given instructions and counseling regarding his condition or for health maintenance advice. Please see specific information pulled into the AVS if appropriate.    Patient or patient representative verbalized consent for the use of Ambient Listening during the visit with  JAVIER Martin for chart documentation. 2/4/2025  19:28 EST

## 2025-02-06 ENCOUNTER — LAB (OUTPATIENT)
Dept: LAB | Facility: HOSPITAL | Age: 65
End: 2025-02-06
Payer: COMMERCIAL

## 2025-02-06 LAB
ALBUMIN SERPL-MCNC: 4 G/DL (ref 3.5–5.2)
ALBUMIN/GLOB SERPL: 1.3 G/DL
ALP SERPL-CCNC: 73 U/L (ref 39–117)
ALT SERPL W P-5'-P-CCNC: 19 U/L (ref 1–41)
ANION GAP SERPL CALCULATED.3IONS-SCNC: 6.5 MMOL/L (ref 5–15)
AST SERPL-CCNC: 20 U/L (ref 1–40)
BILIRUB SERPL-MCNC: 0.4 MG/DL (ref 0–1.2)
BILIRUB UR QL STRIP: ABNORMAL
BUN SERPL-MCNC: 30 MG/DL (ref 8–23)
BUN/CREAT SERPL: 34.9 (ref 7–25)
CALCIUM SPEC-SCNC: 9.2 MG/DL (ref 8.6–10.5)
CHLORIDE SERPL-SCNC: 105 MMOL/L (ref 98–107)
CLARITY UR: CLEAR
CO2 SERPL-SCNC: 28.5 MMOL/L (ref 22–29)
COLOR UR: ABNORMAL
CREAT SERPL-MCNC: 0.86 MG/DL (ref 0.76–1.27)
DEPRECATED RDW RBC AUTO: 39.5 FL (ref 37–54)
EGFRCR SERPLBLD CKD-EPI 2021: 97.9 ML/MIN/1.73
ERYTHROCYTE [DISTWIDTH] IN BLOOD BY AUTOMATED COUNT: 11.9 % (ref 12.3–15.4)
GLOBULIN UR ELPH-MCNC: 3.1 GM/DL
GLUCOSE SERPL-MCNC: 114 MG/DL (ref 65–99)
GLUCOSE UR STRIP-MCNC: NEGATIVE MG/DL
HCT VFR BLD AUTO: 41.6 % (ref 37.5–51)
HGB BLD-MCNC: 14.1 G/DL (ref 13–17.7)
HGB UR QL STRIP.AUTO: NEGATIVE
KETONES UR QL STRIP: ABNORMAL
LEUKOCYTE ESTERASE UR QL STRIP.AUTO: NEGATIVE
MCH RBC QN AUTO: 30.7 PG (ref 26.6–33)
MCHC RBC AUTO-ENTMCNC: 33.9 G/DL (ref 31.5–35.7)
MCV RBC AUTO: 90.4 FL (ref 79–97)
NITRITE UR QL STRIP: NEGATIVE
PH UR STRIP.AUTO: 5.5 [PH] (ref 5–8)
PLATELET # BLD AUTO: 151 10*3/MM3 (ref 140–450)
PMV BLD AUTO: 8.8 FL (ref 6–12)
POTASSIUM SERPL-SCNC: 4.3 MMOL/L (ref 3.5–5.2)
PROT SERPL-MCNC: 7.1 G/DL (ref 6–8.5)
PROT UR QL STRIP: ABNORMAL
PSA SERPL-MCNC: 1.55 NG/ML (ref 0–4)
RBC # BLD AUTO: 4.6 10*6/MM3 (ref 4.14–5.8)
SODIUM SERPL-SCNC: 140 MMOL/L (ref 136–145)
SP GR UR STRIP: 1.02 (ref 1–1.03)
UROBILINOGEN UR QL STRIP: ABNORMAL
WBC NRBC COR # BLD AUTO: 4.2 10*3/MM3 (ref 3.4–10.8)

## 2025-02-06 PROCEDURE — 81003 URINALYSIS AUTO W/O SCOPE: CPT | Performed by: NURSE PRACTITIONER

## 2025-02-06 PROCEDURE — 36415 COLL VENOUS BLD VENIPUNCTURE: CPT | Performed by: NURSE PRACTITIONER

## 2025-02-06 PROCEDURE — 85027 COMPLETE CBC AUTOMATED: CPT | Performed by: NURSE PRACTITIONER

## 2025-02-06 PROCEDURE — 80053 COMPREHEN METABOLIC PANEL: CPT | Performed by: NURSE PRACTITIONER

## 2025-02-06 PROCEDURE — 84153 ASSAY OF PSA TOTAL: CPT | Performed by: NURSE PRACTITIONER

## 2025-02-10 ENCOUNTER — TELEPHONE (OUTPATIENT)
Dept: FAMILY MEDICINE CLINIC | Facility: CLINIC | Age: 65
End: 2025-02-10

## 2025-02-10 DIAGNOSIS — D18.09 HEMANGIOMA OF SPINE: Primary | ICD-10-CM

## 2025-02-10 NOTE — TELEPHONE ENCOUNTER
Called pt to advise pcp ordered colonoscopy for pt back in November. Gastro office has received paperwork pt would just need to call   [phone]   To be scheduled for this. No answer [facility]     Hub to relay

## 2025-02-20 ENCOUNTER — PREP FOR SURGERY (OUTPATIENT)
Dept: SURGERY | Facility: SURGERY CENTER | Age: 65
End: 2025-02-20
Payer: COMMERCIAL

## 2025-02-20 ENCOUNTER — TELEPHONE (OUTPATIENT)
Dept: ORTHOPEDIC SURGERY | Facility: CLINIC | Age: 65
End: 2025-02-20
Payer: COMMERCIAL

## 2025-02-20 DIAGNOSIS — Z12.11 ENCOUNTER FOR SCREENING FOR MALIGNANT NEOPLASM OF COLON: Primary | ICD-10-CM

## 2025-02-20 RX ORDER — SODIUM CHLORIDE, SODIUM LACTATE, POTASSIUM CHLORIDE, CALCIUM CHLORIDE 600; 310; 30; 20 MG/100ML; MG/100ML; MG/100ML; MG/100ML
30 INJECTION, SOLUTION INTRAVENOUS CONTINUOUS PRN
OUTPATIENT
Start: 2025-02-20 | End: 2025-02-20

## 2025-02-20 RX ORDER — SODIUM CHLORIDE 0.9 % (FLUSH) 0.9 %
3 SYRINGE (ML) INJECTION EVERY 12 HOURS SCHEDULED
OUTPATIENT
Start: 2025-02-20

## 2025-02-20 RX ORDER — SODIUM CHLORIDE 0.9 % (FLUSH) 0.9 %
10 SYRINGE (ML) INJECTION AS NEEDED
OUTPATIENT
Start: 2025-02-20

## 2025-02-20 NOTE — TELEPHONE ENCOUNTER
----- Message from Andreas Barrera sent at 2/19/2025  4:28 PM EST -----  Please schedule patient for follow up to review study results- anytime in next 3-4 weeks is fine

## 2025-03-13 ENCOUNTER — OFFICE VISIT (OUTPATIENT)
Dept: ORTHOPEDIC SURGERY | Facility: CLINIC | Age: 65
End: 2025-03-13
Payer: COMMERCIAL

## 2025-03-13 VITALS — BODY MASS INDEX: 26.67 KG/M2 | HEIGHT: 68 IN | WEIGHT: 176 LBS

## 2025-03-13 DIAGNOSIS — M75.81 TENDINITIS OF RIGHT ROTATOR CUFF: ICD-10-CM

## 2025-03-13 DIAGNOSIS — M25.511 RIGHT SHOULDER PAIN, UNSPECIFIED CHRONICITY: Primary | ICD-10-CM

## 2025-03-13 DIAGNOSIS — M75.51 SUBACROMIAL BURSITIS OF BOTH SHOULDERS: ICD-10-CM

## 2025-03-13 DIAGNOSIS — M75.52 SUBACROMIAL BURSITIS OF BOTH SHOULDERS: ICD-10-CM

## 2025-03-13 PROCEDURE — 99213 OFFICE O/P EST LOW 20 MIN: CPT | Performed by: ORTHOPAEDIC SURGERY

## 2025-03-13 NOTE — PROGRESS NOTES
"Subjective:     Patient ID: Bebeto Burgess is a 65 y.o. male.    Chief Complaint:  Follow-up right shoulder pain, follow-up MRI    History of Present Illness  History of Present Illness  Navdeep returns to clinic today for follow-up evaluation regards to his right shoulder as well as follow-up on MRI results.  States he still having moderate pain particular the anterolateral aspect of his right shoulder though does wax and wane, rates currently as a 4-5 out of 10, aching nature, occasional burning type pain.  Localized primarily over the lateral aspect of the shoulder.  No significant radiation of pain down into his arm.  Denies any numbness or tingling.  Moderate improvement with active modification, rest, ice.     Social History     Occupational History    Not on file   Tobacco Use    Smoking status: Never     Passive exposure: Never    Smokeless tobacco: Former   Vaping Use    Vaping status: Never Used   Substance and Sexual Activity    Alcohol use: No    Drug use: No    Sexual activity: Yes     Partners: Female     Birth control/protection: Vasectomy      No past medical history on file.  Past Surgical History:   Procedure Laterality Date    SHOULDER SURGERY  2015       Family History   Problem Relation Age of Onset    Arthritis Mother     Prostate cancer Father     Cancer Father         Prostate    Diabetes Sister     Heart failure Sister     Mental illness Sister     Hypertension Sister     Prostate cancer Brother     Cancer Brother         Prostate    Alcohol abuse Maternal Grandfather     Alcohol abuse Maternal Uncle          Review of Systems        Objective:  Vitals:    03/13/25 0846   Weight: 79.8 kg (176 lb)   Height: 172.7 cm (68\")         03/13/25  0846   Weight: 79.8 kg (176 lb)     Body mass index is 26.76 kg/m².  Physical Exam    Vital signs reviewed.   General: No acute distress, alert and oriented  Eyes: conjunctiva clear; pupils equally round and reactive  ENT: external ears and nose atraumatic; " oropharynx clear  CV: no peripheral edema  Resp: normal respiratory effort  Skin: no rashes or wounds; normal turgor  Psych: mood and affect appropriate; recent and remote memory intact          Physical Exam         Right shoulder-active forward flexion 175 degrees with 4+ out of 5 strength, active external rotation 45 degrees with 4+ out of 5 strength, internal rotation T10, 5 out of 5 strength in belly press test.  Positive Meyer and Neer's, mildly positive decant test, negative drop arm test, negative external Tatian lag sign.  Positive deltoid firing all 3 components.  Brisk cap refill all digits.    Imaging:        Review of outside MRI right shoulder x-ray reviewed images as well as radiology report with independent visualization and interpretation of imaging indicates no evidence of rotator cuff tear, moderate supraspinatus and infraspinatus tendinitis, no evidence of significant glenohumeral chondral loss.  Mild AC joint arthropathy.    Assessment:        1. Right shoulder pain, unspecified chronicity    2. Tendinitis of right rotator cuff    3. Subacromial bursitis of both shoulders           Plan:            Assessment & Plan  Discussed treatment options at length with patient regards to his right shoulder and reviewed with him findings from his MRI indicating no evidence of rotator cuff tear.  At this point in time he is going to supplement with over-the-counter anti-inflammatory medications, soft tissue massage, and home exercise program which we have given him today to work on shoulder range of motion and strengthening as tolerated.  If his pain does get worse may consider subacromial injection.    Bebeto Burgess was in agreement with plan and had all questions answered.     Orders:  No orders of the defined types were placed in this encounter.      Medications:  No orders of the defined types were placed in this encounter.      Followup:  Return if symptoms worsen or fail to improve.    Diagnoses  and all orders for this visit:    1. Right shoulder pain, unspecified chronicity (Primary)    2. Tendinitis of right rotator cuff    3. Subacromial bursitis of both shoulders                  Dictated utilizing Dragon dictation     Patient or patient representative verbalized consent for the use of Ambient Listening during the visit with  Andreas Barrera MD for chart documentation. 4/11/2025  09:11 EDT

## 2025-08-19 ENCOUNTER — HOSPITAL ENCOUNTER (OUTPATIENT)
Facility: SURGERY CENTER | Age: 65
Setting detail: HOSPITAL OUTPATIENT SURGERY
Discharge: HOME | End: 2025-08-19
Attending: INTERNAL MEDICINE | Admitting: INTERNAL MEDICINE
Payer: COMMERCIAL

## 2025-08-19 ENCOUNTER — ANESTHESIA EVENT (OUTPATIENT)
Dept: SURGERY | Facility: SURGERY CENTER | Age: 65
End: 2025-08-19
Payer: COMMERCIAL

## 2025-08-19 ENCOUNTER — ANESTHESIA (OUTPATIENT)
Dept: SURGERY | Facility: SURGERY CENTER | Age: 65
End: 2025-08-19
Payer: COMMERCIAL

## 2025-08-19 PROCEDURE — 25010000002 PROPOFOL 10 MG/ML EMULSION: Performed by: ANESTHESIOLOGY

## 2025-08-19 PROCEDURE — 25010000002 LIDOCAINE 2% SOLUTION: Performed by: ANESTHESIOLOGY

## 2025-08-19 PROCEDURE — 88305 TISSUE EXAM BY PATHOLOGIST: CPT | Performed by: INTERNAL MEDICINE

## 2025-08-19 RX ORDER — LIDOCAINE HYDROCHLORIDE 20 MG/ML
INJECTION, SOLUTION INFILTRATION; PERINEURAL AS NEEDED
Status: DISCONTINUED | OUTPATIENT
Start: 2025-08-19 | End: 2025-08-19 | Stop reason: SURG

## 2025-08-19 RX ORDER — PROPOFOL 10 MG/ML
VIAL (ML) INTRAVENOUS AS NEEDED
Status: DISCONTINUED | OUTPATIENT
Start: 2025-08-19 | End: 2025-08-19 | Stop reason: SURG

## 2025-08-19 RX ADMIN — PROPOFOL 200 MCG/KG/MIN: 10 INJECTION, EMULSION INTRAVENOUS at 08:42

## 2025-08-19 RX ADMIN — PROPOFOL 150 MG: 10 INJECTION, EMULSION INTRAVENOUS at 08:42

## 2025-08-19 RX ADMIN — LIDOCAINE HYDROCHLORIDE 60 MG: 20 INJECTION, SOLUTION INFILTRATION; PERINEURAL at 08:42
